# Patient Record
Sex: FEMALE | Race: WHITE | NOT HISPANIC OR LATINO | Employment: FULL TIME | URBAN - METROPOLITAN AREA
[De-identification: names, ages, dates, MRNs, and addresses within clinical notes are randomized per-mention and may not be internally consistent; named-entity substitution may affect disease eponyms.]

---

## 2019-08-05 ENCOUNTER — LAB (OUTPATIENT)
Dept: LAB | Facility: HOSPITAL | Age: 19
End: 2019-08-05

## 2019-08-05 ENCOUNTER — TRANSCRIBE ORDERS (OUTPATIENT)
Dept: LAB | Facility: HOSPITAL | Age: 19
End: 2019-08-05

## 2019-08-05 DIAGNOSIS — Z13.220 SCREENING FOR LIPOID DISORDERS: ICD-10-CM

## 2019-08-05 DIAGNOSIS — Z13.220 SCREENING FOR LIPOID DISORDERS: Primary | ICD-10-CM

## 2019-08-05 LAB
25(OH)D3 SERPL-MCNC: 56.4 NG/ML (ref 30–100)
ANION GAP SERPL CALCULATED.3IONS-SCNC: 13 MMOL/L (ref 5–15)
BASOPHILS # BLD AUTO: 0.04 10*3/MM3 (ref 0–0.2)
BASOPHILS NFR BLD AUTO: 0.7 % (ref 0–1.5)
BUN BLD-MCNC: 11 MG/DL (ref 6–20)
BUN/CREAT SERPL: 17.5 (ref 7–25)
CALCIUM SPEC-SCNC: 10.5 MG/DL (ref 8.6–10.5)
CHLORIDE SERPL-SCNC: 102 MMOL/L (ref 98–107)
CHOLEST SERPL-MCNC: 184 MG/DL (ref 0–200)
CO2 SERPL-SCNC: 27 MMOL/L (ref 22–29)
CREAT BLD-MCNC: 0.63 MG/DL (ref 0.57–1)
DEPRECATED RDW RBC AUTO: 40.3 FL (ref 37–54)
EOSINOPHIL # BLD AUTO: 0.03 10*3/MM3 (ref 0–0.4)
EOSINOPHIL NFR BLD AUTO: 0.5 % (ref 0.3–6.2)
ERYTHROCYTE [DISTWIDTH] IN BLOOD BY AUTOMATED COUNT: 11.9 % (ref 12.3–15.4)
GFR SERPL CREATININE-BSD FRML MDRD: 120 ML/MIN/1.73
GLUCOSE BLD-MCNC: 99 MG/DL (ref 65–99)
HCT VFR BLD AUTO: 43.5 % (ref 34–46.6)
HDLC SERPL-MCNC: 51 MG/DL (ref 40–60)
HGB BLD-MCNC: 13.8 G/DL (ref 12–15.9)
IMM GRANULOCYTES # BLD AUTO: 0.01 10*3/MM3 (ref 0–0.05)
IMM GRANULOCYTES NFR BLD AUTO: 0.2 % (ref 0–0.5)
LDLC SERPL CALC-MCNC: 109 MG/DL (ref 0–100)
LDLC/HDLC SERPL: 2.14 {RATIO}
LYMPHOCYTES # BLD AUTO: 2.19 10*3/MM3 (ref 0.7–3.1)
LYMPHOCYTES NFR BLD AUTO: 37.6 % (ref 19.6–45.3)
MCH RBC QN AUTO: 29.5 PG (ref 26.6–33)
MCHC RBC AUTO-ENTMCNC: 31.7 G/DL (ref 31.5–35.7)
MCV RBC AUTO: 92.9 FL (ref 79–97)
MONOCYTES # BLD AUTO: 0.47 10*3/MM3 (ref 0.1–0.9)
MONOCYTES NFR BLD AUTO: 8.1 % (ref 5–12)
NEUTROPHILS # BLD AUTO: 3.09 10*3/MM3 (ref 1.7–7)
NEUTROPHILS NFR BLD AUTO: 52.9 % (ref 42.7–76)
NRBC BLD AUTO-RTO: 0 /100 WBC (ref 0–0.2)
PLATELET # BLD AUTO: 256 10*3/MM3 (ref 140–450)
PMV BLD AUTO: 10.4 FL (ref 6–12)
POTASSIUM BLD-SCNC: 4.6 MMOL/L (ref 3.5–5.2)
RBC # BLD AUTO: 4.68 10*6/MM3 (ref 3.77–5.28)
SODIUM BLD-SCNC: 142 MMOL/L (ref 136–145)
TRIGL SERPL-MCNC: 119 MG/DL (ref 0–150)
TSH SERPL DL<=0.05 MIU/L-ACNC: 3.44 MIU/ML (ref 0.27–4.2)
TSH SERPL DL<=0.05 MIU/L-ACNC: 3.44 MIU/ML (ref 0.27–4.2)
VLDLC SERPL-MCNC: 23.8 MG/DL
WBC NRBC COR # BLD: 5.83 10*3/MM3 (ref 3.4–10.8)

## 2019-08-05 PROCEDURE — 80061 LIPID PANEL: CPT

## 2019-08-05 PROCEDURE — 85025 COMPLETE CBC W/AUTO DIFF WBC: CPT

## 2019-08-05 PROCEDURE — 80048 BASIC METABOLIC PNL TOTAL CA: CPT

## 2019-08-05 PROCEDURE — 82306 VITAMIN D 25 HYDROXY: CPT

## 2019-08-05 PROCEDURE — 36415 COLL VENOUS BLD VENIPUNCTURE: CPT

## 2019-08-05 PROCEDURE — 84443 ASSAY THYROID STIM HORMONE: CPT

## 2020-09-18 ENCOUNTER — OFFICE VISIT (OUTPATIENT)
Dept: OBSTETRICS AND GYNECOLOGY | Facility: CLINIC | Age: 20
End: 2020-09-18

## 2020-09-18 VITALS
HEIGHT: 67 IN | BODY MASS INDEX: 20.36 KG/M2 | SYSTOLIC BLOOD PRESSURE: 118 MMHG | DIASTOLIC BLOOD PRESSURE: 78 MMHG | WEIGHT: 129.7 LBS

## 2020-09-18 DIAGNOSIS — Z01.419 ENCOUNTER FOR ANNUAL ROUTINE GYNECOLOGICAL EXAMINATION: Primary | ICD-10-CM

## 2020-09-18 PROCEDURE — 99385 PREV VISIT NEW AGE 18-39: CPT | Performed by: NURSE PRACTITIONER

## 2020-09-18 RX ORDER — NORETHINDRONE ACETATE AND ETHINYL ESTRADIOL 1MG-20(21)
1 KIT ORAL DAILY
Qty: 28 TABLET | Refills: 12 | Status: SHIPPED | OUTPATIENT
Start: 2020-09-18 | End: 2021-07-27 | Stop reason: SDUPTHER

## 2020-09-18 RX ORDER — NORETHINDRONE ACETATE AND ETHINYL ESTRADIOL 1MG-20(21)
1 KIT ORAL DAILY
COMMUNITY
End: 2020-09-18 | Stop reason: SDUPTHER

## 2020-09-18 RX ORDER — FLUCONAZOLE 150 MG/1
150 TABLET ORAL AS NEEDED
Qty: 2 TABLET | Refills: 0 | Status: SHIPPED | OUTPATIENT
Start: 2020-09-18 | End: 2022-12-07

## 2020-09-18 NOTE — PROGRESS NOTES
GYN Annual Exam     CC - Here for annual exam.  C/O random episodes of vaginal itching for the last 4 months.       HPI  Cookie Magana is a 20 y.o. female, , who presents for annual well woman exam. Patient's last menstrual period was 2020 (approximate)..  Periods are regular every 25-35 days, lasting 3 days. The patient uses 1 of tampons/pads per hour., lasting 3 days.  Dysmenorrhea:none.  Patient reports problems with: none.  Partner Status: Marital Status: single.  New Partners since last visit: yes.  Desires STD Screening: yes.    Additional OB/GYN History   Current contraception: contraceptive methods: ocp  Desires to: continue contraception  Last Pap : never had   Last Completed Pap Smear       Status Date      PAP SMEAR No completions recorded        History of abnormal Pap smear: no  Family history of uterine, colon, breast, or ovarian cancer: no  Performs monthly Self-Breast Exam: yes  Exercises Regularly:yes  Feelings of Anxiety or Depression: no  Tobacco Usage?: No   OB History        0    Para   0    Term   0       0    AB   0    Living   0       SAB   0    TAB   0    Ectopic   0    Molar   0    Multiple   0    Live Births   0                Health Maintenance   Topic Date Due   • Annual Gynecologic Pelvic and Breast Exam  1998   • ANNUAL PHYSICAL  2001   • HPV VACCINES (1 - 2-dose series) 2009   • TDAP/TD VACCINES (1 - Tdap) 2017   • INFLUENZA VACCINE  2020   • HEPATITIS C SCREENING  2020   • CHLAMYDIA SCREENING  2020   • PAP SMEAR  2020   •  AMB Pneumococcal Vaccine 65+ (1 of 1 - PPSV23) 2063   •  AMB Pneumococcal Vaccine 0-64  Aged Out   • MENINGOCOCCAL VACCINE (Normal Risk)  Aged Out       The additional following portions of the patient's history were reviewed and updated as appropriate: allergies, current medications, past family history, past medical history, past social history, past surgical history and  "problem list.    Review of Systems   Constitutional: Negative.    HENT: Negative.    Eyes: Negative.    Respiratory: Negative.    Cardiovascular: Negative.    Gastrointestinal: Negative.    Endocrine: Negative.    Genitourinary: Negative.    Musculoskeletal: Negative.    Skin: Negative.    Allergic/Immunologic: Negative.    Neurological: Negative.    Hematological: Negative.    Psychiatric/Behavioral: Negative.      All other systems reviewed and are negative.     I have reviewed and agree with the HPI, ROS, and historical information as entered above. Mariam TAY Gertrude, APRN    Objective   /78   Ht 170.2 cm (67\")   Wt 58.8 kg (129 lb 11.2 oz)   LMP 08/19/2020 (Approximate)   BMI 20.31 kg/m²     Physical Exam  Vitals signs and nursing note reviewed. Exam conducted with a chaperone present.   Constitutional:       Appearance: She is well-developed.   HENT:      Head: Normocephalic and atraumatic.   Neck:      Musculoskeletal: Normal range of motion. No muscular tenderness.      Thyroid: No thyroid mass or thyromegaly.   Cardiovascular:      Rate and Rhythm: Normal rate and regular rhythm.      Heart sounds: No murmur.   Pulmonary:      Effort: Pulmonary effort is normal. No retractions.      Breath sounds: Normal breath sounds. No wheezing, rhonchi or rales.   Chest:      Chest wall: No mass or tenderness.      Breasts:         Right: Normal. No mass, nipple discharge, skin change or tenderness.         Left: Normal. No mass, nipple discharge, skin change or tenderness.   Abdominal:      General: Bowel sounds are normal.      Palpations: Abdomen is soft. Abdomen is not rigid. There is no mass.      Tenderness: There is no abdominal tenderness. There is no guarding.      Hernia: No hernia is present. There is no hernia in the left inguinal area.   Genitourinary:     Labia:         Right: No rash, tenderness or lesion.         Left: No rash, tenderness or lesion.       Vagina: Vaginal discharge present. No " lesions.      Cervix: No cervical motion tenderness, discharge, lesion or cervical bleeding.      Uterus: Normal. Not enlarged, not fixed and not tender.       Adnexa:         Right: No mass or tenderness.          Left: No mass or tenderness.        Rectum: No external hemorrhoid.      Comments: Small amt white discharge  Neurological:      Mental Status: She is alert and oriented to person, place, and time.   Psychiatric:         Behavior: Behavior normal.            Assessment and Plan    Problem List Items Addressed This Visit     None      Visit Diagnoses     Encounter for annual routine gynecological examination    -  Primary    Relevant Orders    Pap IG, Ct-Ng TV Rfx HPV ASCU        WP=negative    1. GYN annual well woman exam.   2. Encouraged use of condoms for STD prevention.  3. OCP's/Vaginal Ring - Discussed side effects of nausea, BTB, headaches, breast tenderness and slight weight gain in the first three cycles.  Understands risks of blood clots, stroke, and theoretical risk of breast cancer.  Denies family history of blood clots.  4. Reviewed monthly self breast exams.  Instructed to call with lumps, pain, or breast discharge.    5. RTC in 1 year or PRN with problems   6. Reviewed vulvar hygiene and products, prn diflucan given but WP neg so unlikely yeast      Mariam Loredo, APRN  09/18/2020

## 2020-10-01 ENCOUNTER — TELEPHONE (OUTPATIENT)
Dept: OBSTETRICS AND GYNECOLOGY | Facility: CLINIC | Age: 20
End: 2020-10-01

## 2020-10-01 NOTE — TELEPHONE ENCOUNTER
Pt calling for pap results - all negative from 9/18  Diflucan and Loestrin 1/20 (on back order) I called the pharmacy and they do not have a different generic - Left message

## 2020-10-05 ENCOUNTER — TELEPHONE (OUTPATIENT)
Dept: OBSTETRICS AND GYNECOLOGY | Facility: CLINIC | Age: 20
End: 2020-10-05

## 2020-10-05 NOTE — TELEPHONE ENCOUNTER
Her OCP is still backordered.  She is due to start period and needs another OCP called in     Loestrin 1/20 was ordered

## 2020-10-16 DIAGNOSIS — Z01.419 ENCOUNTER FOR ANNUAL ROUTINE GYNECOLOGICAL EXAMINATION: ICD-10-CM

## 2020-12-01 ENCOUNTER — LAB (OUTPATIENT)
Dept: LAB | Facility: HOSPITAL | Age: 20
End: 2020-12-01

## 2020-12-01 DIAGNOSIS — L04.9 ACUTE LYMPHADENITIS: Primary | ICD-10-CM

## 2020-12-01 LAB
BASOPHILS # BLD AUTO: 0.02 10*3/MM3 (ref 0–0.2)
BASOPHILS NFR BLD AUTO: 0.4 % (ref 0–1.5)
CHOLEST SERPL-MCNC: 240 MG/DL (ref 0–200)
CRP SERPL-MCNC: 0.15 MG/DL (ref 0–0.5)
DEPRECATED RDW RBC AUTO: 40.9 FL (ref 37–54)
EOSINOPHIL # BLD AUTO: 0.03 10*3/MM3 (ref 0–0.4)
EOSINOPHIL NFR BLD AUTO: 0.6 % (ref 0.3–6.2)
ERYTHROCYTE [DISTWIDTH] IN BLOOD BY AUTOMATED COUNT: 11.9 % (ref 12.3–15.4)
ERYTHROCYTE [SEDIMENTATION RATE] IN BLOOD: 23 MM/HR (ref 0–20)
HCT VFR BLD AUTO: 42.9 % (ref 34–46.6)
HDLC SERPL-MCNC: 51 MG/DL (ref 40–60)
HGB BLD-MCNC: 13.7 G/DL (ref 12–15.9)
IMM GRANULOCYTES # BLD AUTO: 0.02 10*3/MM3 (ref 0–0.05)
IMM GRANULOCYTES NFR BLD AUTO: 0.4 % (ref 0–0.5)
LDLC SERPL CALC-MCNC: 164 MG/DL (ref 0–100)
LDLC/HDLC SERPL: 3.16 {RATIO}
LYMPHOCYTES # BLD AUTO: 1.62 10*3/MM3 (ref 0.7–3.1)
LYMPHOCYTES NFR BLD AUTO: 32.6 % (ref 19.6–45.3)
MCH RBC QN AUTO: 29.9 PG (ref 26.6–33)
MCHC RBC AUTO-ENTMCNC: 31.9 G/DL (ref 31.5–35.7)
MCV RBC AUTO: 93.7 FL (ref 79–97)
MONOCYTES # BLD AUTO: 0.41 10*3/MM3 (ref 0.1–0.9)
MONOCYTES NFR BLD AUTO: 8.2 % (ref 5–12)
NEUTROPHILS NFR BLD AUTO: 2.87 10*3/MM3 (ref 1.7–7)
NEUTROPHILS NFR BLD AUTO: 57.8 % (ref 42.7–76)
NRBC BLD AUTO-RTO: 0 /100 WBC (ref 0–0.2)
PLATELET # BLD AUTO: 272 10*3/MM3 (ref 140–450)
PMV BLD AUTO: 9.6 FL (ref 6–12)
RBC # BLD AUTO: 4.58 10*6/MM3 (ref 3.77–5.28)
TRIGL SERPL-MCNC: 140 MG/DL (ref 0–150)
VLDLC SERPL-MCNC: 25 MG/DL (ref 5–40)
WBC # BLD AUTO: 4.97 10*3/MM3 (ref 3.4–10.8)

## 2020-12-01 PROCEDURE — 85652 RBC SED RATE AUTOMATED: CPT

## 2020-12-01 PROCEDURE — 86140 C-REACTIVE PROTEIN: CPT

## 2020-12-01 PROCEDURE — 85025 COMPLETE CBC W/AUTO DIFF WBC: CPT

## 2020-12-01 PROCEDURE — 36415 COLL VENOUS BLD VENIPUNCTURE: CPT

## 2020-12-01 PROCEDURE — 80061 LIPID PANEL: CPT

## 2021-03-11 ENCOUNTER — LAB (OUTPATIENT)
Dept: LAB | Facility: HOSPITAL | Age: 21
End: 2021-03-11

## 2021-03-11 DIAGNOSIS — Z13.220 SCREENING FOR LIPOID DISORDERS: Primary | ICD-10-CM

## 2021-03-11 LAB
CHOLEST SERPL-MCNC: 219 MG/DL (ref 0–200)
HDLC SERPL-MCNC: 56 MG/DL (ref 40–60)
LDLC SERPL CALC-MCNC: 138 MG/DL (ref 0–100)
LDLC/HDLC SERPL: 2.42 {RATIO}
TRIGL SERPL-MCNC: 138 MG/DL (ref 0–150)
VLDLC SERPL-MCNC: 25 MG/DL (ref 5–40)

## 2021-03-11 PROCEDURE — 80061 LIPID PANEL: CPT

## 2021-03-11 PROCEDURE — 36415 COLL VENOUS BLD VENIPUNCTURE: CPT

## 2021-07-27 ENCOUNTER — OFFICE VISIT (OUTPATIENT)
Dept: OBSTETRICS AND GYNECOLOGY | Facility: CLINIC | Age: 21
End: 2021-07-27

## 2021-07-27 VITALS
SYSTOLIC BLOOD PRESSURE: 110 MMHG | WEIGHT: 130 LBS | BODY MASS INDEX: 20.4 KG/M2 | HEIGHT: 67 IN | DIASTOLIC BLOOD PRESSURE: 70 MMHG

## 2021-07-27 DIAGNOSIS — Z30.8 ENCOUNTER FOR OTHER CONTRACEPTIVE MANAGEMENT: Primary | ICD-10-CM

## 2021-07-27 PROCEDURE — 99213 OFFICE O/P EST LOW 20 MIN: CPT | Performed by: NURSE PRACTITIONER

## 2021-07-27 RX ORDER — NORETHINDRONE ACETATE AND ETHINYL ESTRADIOL 1MG-20(21)
KIT ORAL
Qty: 364 TABLET | Refills: 0 | Status: SHIPPED | OUTPATIENT
Start: 2021-07-27 | End: 2022-11-08 | Stop reason: SDUPTHER

## 2021-07-27 NOTE — PROGRESS NOTES
Chief Complaint   Patient presents with   • Contraception     counseling        Subjective   HPI  Cookie Magana is a 20 y.o. female, , LMP was on Patient's last menstrual period was 2021 (approximate). who presents for Family Planning.    Her periods are irregular, lasting 4 days.  She c/o severe dysmenorrhea during menses.  She stopped OCP's approximately 3 weeks ago.  She did not take them regularly which she suspects contributed to her irregular bleeding and cramping.  She has been using condoms for contraception and last IC was approx. 1 week ago.     She desires Nexplanon.  She is moving [place] for [place] and would like something more convenient and reliable for contraception.       Partner Status: Marital Status: single.  New Partners since last visit: yes.  She had negative STD testing in March. Her past medical history is not noncontributory.  The patient reports     Current Outpatient Medications on File Prior to Visit   Medication Sig Dispense Refill   • fluconazole (DIFLUCAN) 150 MG tablet Take 1 tablet by mouth As Needed (yeast). Take one tablet now and repeat in 3 days 2 tablet 0   • [DISCONTINUED] norethindrone-ethinyl estradiol FE (Loestrin Fe ) 1-20 MG-MCG per tablet Take 1 tablet by mouth Daily. 28 tablet 12     No current facility-administered medications on file prior to visit.        Additional OB/GYN History   Last Pap :   Last Completed Pap Smear          PAP SMEAR (Every 3 Years) Next due on 10/16/2023    10/16/2020  SCANNED - PAP SMEAR    2020  Pap IG, Ct-Ng TV Rfx HPV ASCU              History of abnormal Pap smear: no  Exercises Regularly: yes  Feelings of Anxiety or Depression: no  Tobacco Usage?: No   OB History        0    Para   0    Term   0       0    AB   0    Living   0       SAB   0    TAB   0    Ectopic   0    Molar   0    Multiple   0    Live Births   0                The additional following portions of the patient's history were  "reviewed and updated as appropriate: allergies, current medications, past family history, past medical history, past social history, past surgical history and problem list.    Review of Systems   Constitutional: Negative.    HENT: Negative.    Eyes: Negative.    Respiratory: Negative.    Cardiovascular: Negative.    Gastrointestinal: Negative.    Endocrine: Negative.    Genitourinary: Positive for menstrual problem and pelvic pain.   Musculoskeletal: Negative.    Skin: Negative.    Allergic/Immunologic: Negative.    Neurological: Negative.    Hematological: Negative.    Psychiatric/Behavioral: Negative.      All other systems reviewed and are negative.     I have reviewed and agree with the HPI, ROS, and historical information as entered above. Courtney Ham Armani, APRN    Objective   /70   Ht 170.2 cm (67\")   Wt 59 kg (130 lb)   LMP 07/01/2021 (Approximate)   Breastfeeding No   BMI 20.36 kg/m²     Physical Exam  Vitals and nursing note reviewed. Exam conducted with a chaperone present.   Constitutional:       Appearance: She is well-developed.   HENT:      Head: Normocephalic and atraumatic.   Neck:      Thyroid: No thyroid mass or thyromegaly.   Pulmonary:      Effort: Pulmonary effort is normal. No retractions.   Chest:      Chest wall: No mass.      Breasts:         Right: Normal. No mass, nipple discharge, skin change or tenderness.         Left: Normal. No mass, nipple discharge, skin change or tenderness.   Abdominal:      Palpations: Abdomen is soft. Abdomen is not rigid. There is no mass.      Tenderness: There is no abdominal tenderness. There is no guarding.      Hernia: No hernia is present. There is no hernia in the left inguinal area.   Genitourinary:     Labia:         Right: No rash, tenderness or lesion.         Left: No rash, tenderness or lesion.       Vagina: Normal. No vaginal discharge or lesions.      Cervix: No cervical motion tenderness, discharge, lesion or cervical bleeding.      " Uterus: Normal. Not enlarged, not fixed and not tender.       Adnexa:         Right: No mass or tenderness.          Left: No mass or tenderness.        Rectum: No external hemorrhoid.   Musculoskeletal:      Cervical back: Normal range of motion. No muscular tenderness.   Skin:     General: Skin is warm and dry.   Neurological:      Mental Status: She is alert and oriented to person, place, and time.   Psychiatric:         Mood and Affect: Mood normal.         Behavior: Behavior normal.         Assessment/Plan     Assessment and Plan    Problem List Items Addressed This Visit     None      Visit Diagnoses     Encounter for other contraceptive management    -  Primary    Relevant Medications    norethindrone-ethinyl estradiol FE (Loestrin Fe 1/20) 1-20 MG-MCG per tablet          1.   Rev options, including patch, Nuvaring, Annovera, Nexplanon, Kyleena; risks, benefits, side effects, correct use of each.  She wants to try to  Rx for OCPs for a year and take them with her.  She will call if she decides to change methods before she leaves in Sept.  Enc to take daily same time and encouraged condoms.  She will check to see if her insurance will cover an early annual before she leaves.  Return in about 1 year (around 7/27/2022) for Annual physical.      Courtney Lomeli, APRN  07/27/2021

## 2021-08-13 ENCOUNTER — TELEPHONE (OUTPATIENT)
Dept: OBSTETRICS AND GYNECOLOGY | Facility: CLINIC | Age: 21
End: 2021-08-13

## 2021-08-13 NOTE — TELEPHONE ENCOUNTER
Explained that this was sent 7/27 and confirmed by the pharmacy at 6:16pm. She will call them back

## 2021-08-13 NOTE — TELEPHONE ENCOUNTER
Patient left a message stating that she spoke with the pharmacy in [place] and they are requesting our office call them to give a verbal instead of just electronically sending it there

## 2021-08-25 PROCEDURE — U0004 COV-19 TEST NON-CDC HGH THRU: HCPCS | Performed by: FAMILY MEDICINE

## 2022-08-08 DIAGNOSIS — Z30.8 ENCOUNTER FOR OTHER CONTRACEPTIVE MANAGEMENT: ICD-10-CM

## 2022-08-08 RX ORDER — NORETHINDRONE ACETATE AND ETHINYL ESTRADIOL 1MG-20(21)
KIT ORAL
Qty: 364 TABLET | Refills: 0 | OUTPATIENT
Start: 2022-08-08

## 2022-11-08 DIAGNOSIS — Z30.8 ENCOUNTER FOR OTHER CONTRACEPTIVE MANAGEMENT: ICD-10-CM

## 2022-11-08 RX ORDER — NORETHINDRONE ACETATE AND ETHINYL ESTRADIOL 1MG-20(21)
KIT ORAL
Qty: 364 TABLET | Refills: 12 | Status: SHIPPED | OUTPATIENT
Start: 2022-11-08 | End: 2022-11-09

## 2022-11-08 NOTE — TELEPHONE ENCOUNTER
Caller: Cookie Magana    Relationship: Self    Best call back number: [phone] CALL ANYTIME, IT IS OKAY TO LVM.    Requested Prescriptions:   Requested Prescriptions     Pending Prescriptions Disp Refills   • norethindrone-ethinyl estradiol FE (Loestrin Fe ) 1-20 MG-MCG per tablet 364 tablet 0     Si po qd     LOESTRIN FE 1-20 MG-MCG TABLET    Pharmacy where request should be sent:  [address]    Additional details provided by patient: PT IS SCHEDULED FOR ANNUAL APPT 22. PT IS OUT OF BIRTH CONTROL MEDICATION. PT IS REQUESTING A REFILL THAT WILL LAST UNTIL SHE CAN BE SEEN 22    Does the patient have less than a 3 day supply:  [x] Yes  [] No    Sissy Travis Rep   22 13:04 EST

## 2022-11-09 ENCOUNTER — TELEPHONE (OUTPATIENT)
Dept: OBSTETRICS AND GYNECOLOGY | Facility: CLINIC | Age: 22
End: 2022-11-09

## 2022-11-09 RX ORDER — NORETHINDRONE ACETATE AND ETHINYL ESTRADIOL 1; .02 MG/1; MG/1
1 TABLET ORAL DAILY
Qty: 28 TABLET | Refills: 0 | Status: SHIPPED | OUTPATIENT
Start: 2022-11-09 | End: 2022-11-28

## 2022-11-09 NOTE — TELEPHONE ENCOUNTER
S/w patient- patient requesting refill on birth control. Patient states that she is not moving out of the country and does not need a year's worth at once. Rx change and sent to patient pharmacy.

## 2022-11-09 NOTE — TELEPHONE ENCOUNTER
PT CALLING BECAUSE RX FOR BC WAS SENT FOR A YR SUPPLY AND SHE NEEDS IT TO ONLY BE A ONE MONTH TO BE APPROVED BY PHARMACY.

## 2022-11-28 RX ORDER — NORETHINDRONE ACETATE AND ETHINYL ESTRADIOL 1; 20 MG/1; UG/1
TABLET ORAL
Qty: 21 TABLET | Refills: 0 | Status: SHIPPED | OUTPATIENT
Start: 2022-11-28 | End: 2022-12-07

## 2022-12-07 ENCOUNTER — OFFICE VISIT (OUTPATIENT)
Dept: OBSTETRICS AND GYNECOLOGY | Facility: CLINIC | Age: 22
End: 2022-12-07

## 2022-12-07 VITALS
SYSTOLIC BLOOD PRESSURE: 102 MMHG | HEIGHT: 68 IN | BODY MASS INDEX: 19.82 KG/M2 | WEIGHT: 130.8 LBS | DIASTOLIC BLOOD PRESSURE: 72 MMHG

## 2022-12-07 DIAGNOSIS — Z01.419 WOMEN'S ANNUAL ROUTINE GYNECOLOGICAL EXAMINATION: Primary | ICD-10-CM

## 2022-12-07 DIAGNOSIS — Z30.41 ENCOUNTER FOR SURVEILLANCE OF CONTRACEPTIVE PILLS: ICD-10-CM

## 2022-12-07 PROCEDURE — 99395 PREV VISIT EST AGE 18-39: CPT | Performed by: NURSE PRACTITIONER

## 2022-12-07 RX ORDER — NORETHINDRONE ACETATE AND ETHINYL ESTRADIOL 1MG-20(21)
1 KIT ORAL DAILY
Qty: 84 TABLET | Refills: 3 | Status: SHIPPED | OUTPATIENT
Start: 2022-12-07

## 2022-12-07 NOTE — PROGRESS NOTES
Gynecologic Annual Exam Note        Gynecologic Exam (Annual )        Subjective     HPI  Cookie Magana is a 22 y.o.  female who presents for annual well woman exam as a established patient. There were no changes to her medical or surgical history since her last visit.. Patient reports problems with: none. Patient's last menstrual period was 2022.. Her periods are regular every 25-35 days, lasting 5 days. The flow is light. Dysmenorrhea:none. . Partner Status: Marital Status: single.  She is sexually active. She has had new partners.. STD testing recommendations have been explained to the patient and she does desire STD testing.    Patient would like to discuss an IUD in the future.     Additional OB/GYN History   Current contraception: contraceptive methods: OCP (estrogen/progesterone)  Desires to: discuss contraception  Thromboembolic Disease: family history  Age of menarche: 12    History of STD: no    Last Pap : 20. Results: negative. HPV: not done  Last Completed Pap Smear          Ordered - PAP SMEAR (Every 3 Years) Ordered on 2022    10/16/2020  SCANNED - PAP SMEAR    2020  Pap IG, Ct-Ng TV Rfx HPV ASCU                 History of abnormal Pap smear: no  Gardasil status:completed  Family history of uterine, colon, breast, or ovarian cancer: no  Performs monthly Self-Breast Exam: no  Exercises Regularly:yes  Feelings of Anxiety or Depression: yes - Anxiety   Tobacco Usage?: No       Current Outpatient Medications:   •  norethindrone-ethinyl estradiol FE (Junel FE 1/20) 1-20 MG-MCG per tablet, Take 1 tablet by mouth Daily., Disp: 84 tablet, Rfl: 3     Patient is requesting refills of OCP.    OB History        0    Para   0    Term   0       0    AB   0    Living   0       SAB   0    IAB   0    Ectopic   0    Molar   0    Multiple   0    Live Births   0                Health Maintenance   Topic Date Due   • HPV VACCINES (1 - 2-dose series) Never done   •  "HEPATITIS C SCREENING  Never done   • COVID-19 Vaccine (3 - Booster for Pfizer series) 06/16/2021   • Annual Gynecologic Pelvic and Breast Exam  10/17/2021   • INFLUENZA VACCINE  Never done   • PAP SMEAR  10/16/2023   • CHLAMYDIA SCREENING  12/07/2023   • TDAP/TD VACCINES (2 - Td or Tdap) 08/05/2029   • Pneumococcal Vaccine 0-64  Aged Out       Past Medical History:   Diagnosis Date   • Anxiety    • PMS (premenstrual syndrome)         Past Surgical History:   Procedure Laterality Date   • WISDOM TOOTH EXTRACTION         The additional following portions of the patient's history were reviewed and updated as appropriate: allergies, current medications, past family history, past medical history, past social history, past surgical history and problem list.    Review of Systems   Constitutional: Negative.    Cardiovascular: Negative.    Gastrointestinal: Negative.    Genitourinary: Negative.    Psychiatric/Behavioral: Negative.         Anxiety         I have reviewed and agree with the HPI, ROS, and historical information as entered above. Asmita Ole, APRN        Objective   /72   Ht 172.7 cm (68\")   Wt 59.3 kg (130 lb 12.8 oz)   LMP 11/16/2022   BMI 19.89 kg/m²     Physical Exam  Vitals and nursing note reviewed. Exam conducted with a chaperone present.   Constitutional:       Appearance: She is well-developed.   HENT:      Head: Normocephalic and atraumatic.   Neck:      Thyroid: No thyroid mass or thyromegaly.   Cardiovascular:      Rate and Rhythm: Normal rate and regular rhythm.      Heart sounds: No murmur heard.  Pulmonary:      Effort: Pulmonary effort is normal. No retractions.      Breath sounds: Normal breath sounds. No wheezing, rhonchi or rales.   Chest:      Chest wall: No mass or tenderness.   Breasts:     Right: Normal. No mass, nipple discharge, skin change or tenderness.      Left: Normal. No mass, nipple discharge, skin change or tenderness.   Abdominal:      Palpations: Abdomen is soft. " Abdomen is not rigid. There is no mass.      Tenderness: There is no abdominal tenderness. There is no guarding.      Hernia: No hernia is present.   Genitourinary:     General: Normal vulva.      Labia:         Right: No rash, tenderness or lesion.         Left: No rash, tenderness or lesion.       Vagina: Normal. No vaginal discharge or lesions.      Cervix: No cervical motion tenderness, discharge, lesion or cervical bleeding.      Uterus: Normal. Not enlarged, not fixed and not tender.       Adnexa: Right adnexa normal and left adnexa normal.        Right: No mass or tenderness.          Left: No mass or tenderness.        Rectum: Normal. No external hemorrhoid.   Musculoskeletal:      Cervical back: Normal range of motion. No muscular tenderness.   Neurological:      Mental Status: She is alert and oriented to person, place, and time.   Psychiatric:         Behavior: Behavior normal.            Assessment and Plan    Problem List Items Addressed This Visit    None  Visit Diagnoses     Women's annual routine gynecological examination    -  Primary    Relevant Orders    LIQUID-BASED PAP SMEAR, [facility] ([facility])    Encounter for surveillance of contraceptive pills        Relevant Medications    norethindrone-ethinyl estradiol FE (Junel FE 1/20) 1-20 MG-MCG per tablet          1. GYN annual well woman exam.   2. Doing well on current ocps. (Junel 1/20).  May be interested in Kyleena IUD later. Information given to patient for review  3. Reviewed pap guidelines.   4. Reviewed monthly self breast exams.  Instructed to call with lumps, pain, or breast discharge.    5. Reviewed exercise as a preventative health measures.   6. Reccommended Flu Vaccine in Fall of each year.  7. RTC in 1 year or PRN with problems        Asmita Handy, APRN  12/07/2022

## 2022-12-09 ENCOUNTER — TELEPHONE (OUTPATIENT)
Dept: OBSTETRICS AND GYNECOLOGY | Facility: CLINIC | Age: 22
End: 2022-12-09

## 2022-12-09 LAB — REF LAB TEST METHOD: NORMAL

## 2022-12-09 NOTE — TELEPHONE ENCOUNTER
BRIDGET Handy pt     Per Asmita, Pap showed ASCUS HPV negative. STD testing negative. recommendation is to repeat pap in one year since ASCUS but not concerning since HPV is negative. Pt XIAO.

## 2022-12-20 RX ORDER — NORETHINDRONE ACETATE AND ETHINYL ESTRADIOL 1; 20 MG/1; UG/1
TABLET ORAL
Qty: 21 TABLET | Refills: 0 | OUTPATIENT
Start: 2022-12-20

## 2023-03-17 ENCOUNTER — TELEPHONE (OUTPATIENT)
Dept: OBSTETRICS AND GYNECOLOGY | Facility: CLINIC | Age: 23
End: 2023-03-17
Payer: COMMERCIAL

## 2023-03-17 ENCOUNTER — TELEPHONE (OUTPATIENT)
Dept: OBSTETRICS AND GYNECOLOGY | Facility: CLINIC | Age: 23
End: 2023-03-17

## 2023-03-17 NOTE — TELEPHONE ENCOUNTER
Pt calling in reporting breast pain  Stated right breast stated 10 oclock position close to arm pit  Stated it has been looked at before but was instructed to call if she had pain outside of menstrual cycle and it has now done so stated outside of menstrual window about two months and is progressively becoming more often    stated pain is 4/10 on pain scale pt stated pain is dull and achey pt stated it hasnt gotten bigger   Reports no redness swelling or streaking and no discharge or blood coming from nipples

## 2023-03-17 NOTE — TELEPHONE ENCOUNTER
Caller: Cookie Magana    Relationship: Self    Best call back number: [phone]    What is the best time to reach you: ANYTIME CAN LVM IF NA    Who are you requesting to speak with (clinical staff, provider,  specific staff member): JEOVANNY    Do you know the name of the person who called: JEOVANNY    What was the call regarding: BREAST PAIN    Do you require a callback: YES      ”

## 2023-04-14 NOTE — PROGRESS NOTES
OBGYN Office Note        Subjective     HPI  Cookie Magana is a 22 y.o. female, , who presents with breast complaints of a mass.  This is present in right breast(s).    She states she has experienced this problem for 1 year.  She describes the severity as moderate.  She states that the problem is worsening.  She reports breast pain that is achy and feels like a pinch. The pain comes and goes and is worse when she is not on her period or about to start her period. It sometimes radiates to under her right armpit. On average she rates her pain a 4/10 and when it is at it's worst a 6/10. She has tried to cut back on caffeine and has tried OTC NSAIDS with little to no relief. She does not have a family history of breast cancer..  The patient denies rash, redness, nipple discharge, peeling skin.    Her last LMP was Patient's last menstrual period was 2023 (exact date)..  Periods are regular every 28-30 days. Patient states her periods were regular prior to having to take a Plan B pill at the beginning of this month.    Current contraception: contraceptive methods: OCP (estrogen/progesterone)    Last mammogram: Never   Last Completed Mammogram     This patient has no relevant Health Maintenance data.        Tobacco Usage?: No     OB History        0    Para   0    Term   0       0    AB   0    Living   0       SAB   0    IAB   0    Ectopic   0    Molar   0    Multiple   0    Live Births   0                Past Medical History:   Diagnosis Date   • Anxiety    • PMS (premenstrual syndrome)        Past Surgical History:   Procedure Laterality Date   • WISDOM TOOTH EXTRACTION         Family History   Problem Relation Age of Onset   • Coronary artery disease Father    • Deep vein thrombosis Father    • Hypertension Mother           Review of Systems   Genitourinary: Positive for breast lump and breast pain.   All other systems reviewed and are negative.      I have reviewed and agree with the  "HPI, ROS, and historical information as entered above. Carmel Iglesias MD    Objective   /70   Ht 172.7 cm (67.99\")   Wt 62.5 kg (137 lb 12.8 oz)   LMP 03/06/2023 (Exact Date)   BMI 20.96 kg/m²     Physical Exam  Vitals and nursing note reviewed. Exam conducted with a chaperone present.   HENT:      Head: Normocephalic and atraumatic.   Pulmonary:      Effort: Pulmonary effort is normal. No respiratory distress or retractions.   Chest:      Chest wall: No mass.   Breasts:     Right: No swelling, bleeding, inverted nipple, mass, nipple discharge, skin change or tenderness.      Left: No swelling, bleeding, inverted nipple, mass, nipple discharge, skin change or tenderness.   Lymphadenopathy:      Upper Body:      Right upper body: No supraclavicular or axillary adenopathy.      Left upper body: No supraclavicular or axillary adenopathy.   Neurological:      Mental Status: She is alert.   Psychiatric:         Mood and Affect: Mood and affect normal.         Speech: Speech normal.           Assessment & Plan     Assessment     Problem List Items Addressed This Visit    None  Visit Diagnoses     Mastodynia    -  Primary    Relevant Orders    US Breast Right Complete            Plan   1. Patient presents today for evaluation of mastodynia.  She reports intermittent episodes of right breast pain over the last year.  She feels like this has been worsening more recently.  Typically the pain correlates to her cycle, but she has noted that sometimes this does not correlate.  At the worst the pain is a 6 out of 10.  This is always present in the right breast in the upper inner quadrant.  The left breast is completely asymptomatic.  She has not been able to palpate a mass and has no skin changes or other concerns with the appearance of her breast.  She has recently tried to cut down on caffeine, but has not had a significant change in her symptoms.  Her examination is within normal limits today.  We will proceed with a " right breast ultrasound to further evaluate and ensure there is no mass or cyst.  I have instructed the patient to continue breast awareness and monitor her symptoms.  We did discuss over-the-counter treatment options as well.  Patient to call if worsening or not improving.  2. We did discuss wearing a properly fitting supportive bra.  3. Return for Next scheduled follow up.      Carmel Iglesias MD  04/17/2023

## 2023-04-17 ENCOUNTER — OFFICE VISIT (OUTPATIENT)
Dept: OBSTETRICS AND GYNECOLOGY | Facility: CLINIC | Age: 23
End: 2023-04-17
Payer: COMMERCIAL

## 2023-04-17 VITALS
SYSTOLIC BLOOD PRESSURE: 100 MMHG | HEIGHT: 68 IN | WEIGHT: 137.8 LBS | BODY MASS INDEX: 20.88 KG/M2 | DIASTOLIC BLOOD PRESSURE: 70 MMHG

## 2023-04-17 DIAGNOSIS — N64.4 MASTODYNIA: Primary | ICD-10-CM

## 2023-06-14 ENCOUNTER — HOSPITAL ENCOUNTER (OUTPATIENT)
Dept: ULTRASOUND IMAGING | Facility: HOSPITAL | Age: 23
Discharge: HOME | End: 2023-06-14
Admitting: OBSTETRICS & GYNECOLOGY
Payer: COMMERCIAL

## 2023-06-14 DIAGNOSIS — N64.4 MASTODYNIA: ICD-10-CM

## 2023-06-14 PROCEDURE — 76642 ULTRASOUND BREAST LIMITED: CPT

## 2023-10-24 ENCOUNTER — OFFICE VISIT (OUTPATIENT)
Dept: FAMILY MEDICINE CLINIC | Facility: CLINIC | Age: 23
End: 2023-10-24
Payer: COMMERCIAL

## 2023-10-24 VITALS
TEMPERATURE: 98 F | BODY MASS INDEX: 21.84 KG/M2 | DIASTOLIC BLOOD PRESSURE: 78 MMHG | RESPIRATION RATE: 20 BRPM | HEART RATE: 92 BPM | WEIGHT: 143.6 LBS | SYSTOLIC BLOOD PRESSURE: 112 MMHG | OXYGEN SATURATION: 96 %

## 2023-10-24 DIAGNOSIS — M25.572 ACUTE LEFT ANKLE PAIN: ICD-10-CM

## 2023-10-24 DIAGNOSIS — M79.672 LEFT FOOT PAIN: ICD-10-CM

## 2023-10-24 DIAGNOSIS — S99.912A INJURY OF LEFT ANKLE, INITIAL ENCOUNTER: Primary | ICD-10-CM

## 2023-10-24 NOTE — PROGRESS NOTES
Chief Complaint   Patient presents with    Ankle Injury     Left, injury 9/9/23       HPI     Cookie Magana is a pleasant 23 y.o. female with a PMH of hyperlipidemia who presents for an initial visit.     Previously followed by primary care provider in [place], [place]. Just moved back to [place] in July.  On 9/9 she states she suffered a left ankle inversion ankle injury when she stepped in a hole in the ground on a family member's property. Thought she had fractured her ankle at first due to initial pain. Immediately got up and walked but felt wobbly and that things were moving around. Tried Rosette's wart topically which helped with pain (sister is an herbalist). She has pain with range of motion, walking, rolling over in bed, and during cold weather. It no longer seems to be improving. She has been icing, heating, elevating. She denies prior ankle injuries or problems. No prior imaging or evaluation. Pain 3/10 currently.     Past Medical History:   Diagnosis Date    Anxiety     PMS (premenstrual syndrome)        Past Surgical History:   Procedure Laterality Date    WISDOM TOOTH EXTRACTION         Family History   Problem Relation Age of Onset    Hypertension Mother     Coronary artery disease Father     Deep vein thrombosis Father     Breast cancer Neg Hx     Ovarian cancer Neg Hx        Social History     Socioeconomic History    Marital status: Single   Tobacco Use    Smoking status: Never    Smokeless tobacco: Never   Vaping Use    Vaping Use: Never used   Substance and Sexual Activity    Alcohol use: Yes    Drug use: Never    Sexual activity: Yes     Partners: Male     Birth control/protection: Condom       Allergies   Allergen Reactions    Cefdinir Other (See Comments)       ROS    Review of Systems   Musculoskeletal:  Positive for arthralgias and joint swelling.       Vitals:    10/24/23 1124   BP: 112/78   Pulse: 92   Resp: 20   Temp: 98 °F (36.7 °C)   SpO2: 96%     Body mass index is 21.84  kg/m².      Current Outpatient Medications:     norethindrone-ethinyl estradiol FE (Junel FE 1/20) 1-20 MG-MCG per tablet, Take 1 tablet by mouth Daily. (Patient not taking: Reported on 10/24/2023), Disp: 84 tablet, Rfl: 3    PE    Physical Exam  Vitals reviewed.   Constitutional:       General: She is not in acute distress.  Pulmonary:      Effort: Pulmonary effort is normal. No respiratory distress.   Musculoskeletal:      Left foot: Normal range of motion.        Feet:    Feet:      Comments: Left ankle and foot tenderness as shown. Some bony lateral malleolus tenderness. Pt reports pain with ankle ROM which is intact. Neurovascularly intact.   Neurological:      Mental Status: She is alert.   Psychiatric:         Mood and Affect: Mood normal.          A/P    Problem List Items Addressed This Visit    None  Visit Diagnoses       Injury of left ankle, initial encounter    -  Primary    Order x-rays of foot/ankle to r/o fracture.   Given more than 6-week duration, will plan on orthopedic consulation.    Relevant Orders    XR Ankle 3+ View Left    Acute left ankle pain        Relevant Orders    XR Ankle 3+ View Left    Left foot pain        Relevant Orders    XR Foot 3+ View Left            Plan of care was reviewed with patient at the conclusion of today's visit. Education was provided regarding diagnoses, management, prescribed or recommended OTC products, and the importance of compliance with follow-up appointments. The patient was counseled regarding the risks, benefits, and possible side-effects of treatment. I advised the patient to keep me informed of any acute changes in their status including new, worsening, or persistent symptoms. Patient expresses understanding and agreement with the management plan.        KAN Zhang

## 2023-10-27 ENCOUNTER — PATIENT ROUNDING (BHMG ONLY) (OUTPATIENT)
Dept: FAMILY MEDICINE CLINIC | Facility: CLINIC | Age: 23
End: 2023-10-27
Payer: COMMERCIAL

## 2023-11-13 ENCOUNTER — TELEPHONE (OUTPATIENT)
Dept: FAMILY MEDICINE CLINIC | Facility: CLINIC | Age: 23
End: 2023-11-13
Payer: COMMERCIAL

## 2023-11-13 DIAGNOSIS — S99.912A INJURY OF LEFT ANKLE, INITIAL ENCOUNTER: Primary | ICD-10-CM

## 2023-11-13 DIAGNOSIS — M25.572 ACUTE LEFT ANKLE PAIN: ICD-10-CM

## 2023-11-13 DIAGNOSIS — M79.672 LEFT FOOT PAIN: ICD-10-CM

## 2023-11-13 NOTE — TELEPHONE ENCOUNTER
Caller: Cookie Magana    Relationship: Self    Best call back number:  [phone] PLEASE CALL     Who are you requesting to speak with (clinical staff, provider,  specific staff member): CLINICAL    What was the call regarding: INFORMATION ABOUT A FOLLOW UP WITH AN ORTHOPEDIC SURGEON FOR HER LEFT ANKLE AND POSSIBLY AN MRI

## 2023-11-14 NOTE — TELEPHONE ENCOUNTER
She should be contacted within 1 week on her orthopedic referral. Advise her to notify the office if she has not been contacted. Thank you.

## 2023-11-16 ENCOUNTER — TELEPHONE (OUTPATIENT)
Dept: ORTHOPEDIC SURGERY | Facility: CLINIC | Age: 23
End: 2023-11-16

## 2023-11-16 NOTE — TELEPHONE ENCOUNTER
Caller: Cookie Magana    Relationship to patient: Self    Best call back number: [phone]    Patient is needing:  PATIENT WAS REFERRED BY SU[facility] TO DR. MONROY BUT OFFICE ADVISED SCHEDULING WITH DR. MARRERO AT [facility]. PATIENT REQUESTING TO BE SCHEDULED WITH DR. MONROY SINCE DR. MARRERO DOES NOT HAVE ANY REVIEWS. PLEASE ADVISE.

## 2023-11-30 ENCOUNTER — OFFICE VISIT (OUTPATIENT)
Age: 23
End: 2023-11-30
Payer: COMMERCIAL

## 2023-11-30 VITALS
SYSTOLIC BLOOD PRESSURE: 102 MMHG | HEIGHT: 68 IN | BODY MASS INDEX: 20.34 KG/M2 | DIASTOLIC BLOOD PRESSURE: 68 MMHG | WEIGHT: 134.2 LBS

## 2023-11-30 DIAGNOSIS — S93.492A SPRAIN OF ANTERIOR TALOFIBULAR LIGAMENT OF LEFT ANKLE, INITIAL ENCOUNTER: Primary | ICD-10-CM

## 2023-11-30 DIAGNOSIS — M95.8 OSTEOCHONDRAL DEFECT OF ANKLE: ICD-10-CM

## 2023-11-30 NOTE — PROGRESS NOTES
[facility] Visit     Office Visit       Date: 11/30/2023   Patient Name: Cookie Magana  MRN: [id number]  YOB: 2000    Referring Physician: Mitchell Quinn PA     Chief Complaint:   Chief Complaint   Patient presents with    Left Foot - Pain     History of Present Illness:   Cookie Magana is a 23 y.o. female who presents with new problem of: left foot pain.  Onset: twisting injury. The issue has been ongoing for 2.5 month(s) 9/8/23. Pain is a 3/10 on the pain scale. Pain is described as dull and aching. Associated symptoms include pain, swelling, and stiffness. The pain is worse with walking, sitting, climbing stairs, sleeping, and any movement of the joint; resting improve the pain. Previous treatments have included: NSAIDS.    Subjective   Review of Systems: Review of Systems   Constitutional:  Negative for chills, fever, unexpected weight gain and unexpected weight loss.   HENT:  Negative for congestion, postnasal drip and rhinorrhea.    Eyes:  Negative for blurred vision.   Respiratory:  Negative for shortness of breath.    Cardiovascular:  Negative for leg swelling.   Gastrointestinal:  Negative for abdominal pain, nausea and vomiting.   Genitourinary:  Negative for difficulty urinating.   Musculoskeletal:  Positive for arthralgias. Negative for gait problem, joint swelling and myalgias.   Skin:  Negative for skin lesions and wound.   Neurological:  Negative for dizziness, weakness, light-headedness and numbness.   Hematological:  Does not bruise/bleed easily.   Psychiatric/Behavioral:  Negative for depressed mood.    All other systems reviewed and are negative.       I have reviewed the following portions of the patient's history:History of Present Illness and review of systems.    Past Medical History:   Past Medical History:   Diagnosis Date    Anxiety     PMS (premenstrual syndrome)        Past Surgical History:   Past Surgical  "History:   Procedure Laterality Date    WISDOM TOOTH EXTRACTION         Family History:   Family History   Problem Relation Age of Onset    Hypertension Mother     Hyperlipidemia Mother     Coronary artery disease Father     Deep vein thrombosis Father     Hyperlipidemia Father     Breast cancer Neg Hx     Ovarian cancer Neg Hx        Social History:   Social History     Socioeconomic History    Marital status: Single   Tobacco Use    Smoking status: Never    Smokeless tobacco: Never   Vaping Use    Vaping Use: Never used   Substance and Sexual Activity    Alcohol use: Yes    Drug use: Never    Sexual activity: Yes     Partners: Male     Birth control/protection: Condom       Medications: No current outpatient medications on file.    Allergies:   Allergies   Allergen Reactions    Cefdinir Other (See Comments)       I reviewed the patient's chief complaint, history of present illness, review of systems, past medical history, surgical history, family history, social history, medications and allergy list.     Objective    Vital Signs:   Vitals:    11/30/23 0801   BP: 102/68   Weight: 60.9 kg (134 lb 3.2 oz)   Height: 171.5 cm (67.5\")     Body mass index is 20.71 kg/m².   BMI is within normal parameters. No other follow-up for BMI required.     Patient reports that she is a non-smoker and has not ever been a smoker.  This behavior was applauded and she was encouraged to continue in smoking cessation.  We will continue to monitor at subsequent visits.    Ortho Exam:  Constitutional: General Appearance: healthy-appearing, NAD, and normal body habitus.   Psychiatric: Orientation: oriented to time, place, and person. Mood and Affect: normal mood and affect and active and alert.   Cardiovascular System: Arterial Pulses Right: dorsalis pedis normal. Arterial Pulses Left: dorsalis pedis normal. Edema Right: no edema. Edema Left: no edema. Varicosities Right: no varicosities and capillary refill test normal. Varicosities Left: " no varicosities and capillary refill test normal.   Gait and Station: Appearance: normal gait, no limp, and ambulating with no assistive devices.   Ankles and Feet: Inspection Right: no erythema, induration, swelling, warmth, or deformity and normal alignment. Inspection Left: no erythema, induration, warmth, or deformity and normal alignment and swelling (lateral ankle). Bony Palpation of the Ankle/Foot Left: no tenderness of the sesamoids, the ankle, the calcaneal tuberosity, the metatarsals, the tarsometatarsal joints, the navicular tuberosity, the dome of talus, the head of talus, the inferior tibiofibular joint, or the achilles tendon insertion. Soft Tissue Palpation of the Ankle/Foot Left: no tenderness of the tibialis posterior, the tibialis anterior, the plantar fascia, the achilles tendon, the peroneus longus and brevis, the extensor hallucis longus, the sinus tarsi, the lateral anterior talofibular ligament, the posterior talofibular ligament, the peroneal retinaculum, the spring ligament, or the retrocalcaneal bursae and tenderness of the anterior talofibular ligament, the calcaneofibular ligament, and the deltoid ligament; tender over anterior joint line. Active Range of Motion Left: great toe flexion normal and extension normal and plantar flexion normal, inversion normal, eversion normal, and dorsiflexion (10 deg.). Stability Right: anterior drawer negative and talar tilt negative. Stability Left: anterior drawer grade 1+ and talar tilt grade 1+.   Neurological System: Sensation on the Right: normal at the lateral plantar nerve, the medial plantar nerve, the superficial peroneal nerve, the deep peroneal nerve, the sural nerve, and the saphenous nerve and normal distal extremities. Sensation on the Left: normal at the lateral plantar nerve, the medial plantar nerve, the superficial peroneal nerve, the deep peroneal nerve, the sural nerve, and the saphenous nerve and normal distal extremities. Special  Tests on the Right: Dickey's test negative, Hoffa's test negative, Tinel's test negative, external rotation test negative, and squeeze test negative. Special Tests on the Left: Dickey's test negative, Hoffa's test negative, Tinel's test negative, external rotation test negative, and squeeze test negative.   Skin: Right Lower Extremity: normal. Left Lower Extremity: normal.    Results Review:   Imaging Results (Last 24 Hours)       ** No results found for the last 24 hours. **        I personally viewed the radiographs of the left wrist and left ankle from 10/4/2023.  No acute fracture or dislocation.  No significant soft tissue swelling.    Procedures    Assessment / Plan    Assessment/Plan:   Diagnoses and all orders for this visit:    1. Sprain of anterior talofibular ligament of left ankle, initial encounter (Primary)  -     Ambulatory Referral to Physical Therapy Evaluate and treat, Ortho; Electrotherapy; Tens (Home), Iontophoresis, E-stim; Desensitization, Soft Tissue Mobilizaton, Cross Fiber; Stretching, Strengthening, ROM; Left (proprioception training); Full weig...  -     MRI Ankle Left Without Contrast; Future    2. Osteochondral defect of ankle  -     MRI Ankle Left Without Contrast; Future    Inversion mechanism left ankle injury that occurred on 9/8/2023.  Unfortunately, she has not healed well.  She is able to walk in a regular shoe but still has some persistent pain over the lateral malleolus as well as soft tissue swelling over the ATFL.  Acutely tender over the ATFL as well and pain with talar tilt.  Radiographs obtained 1 month prior did not show any acute osseous abnormality in the ankle or foot.  However, I am worried given the persistence of pain and swelling that she may have an osteochondral defect in the tibiotalar joint resulting from this injury.  I recommended that we obtain MRI of the left ankle to rule out this condition.  In the meantime, I would like her to get into physical  therapy and have provided her with a referral today.  This would include proprioception training.  Of encouraged her to continue icing the ankle to help with her swelling.  She can take Tylenol and/or ibuprofen as needed for pain control.  I will see her back after the MRI to discuss results and next [facility] treatment.    Previous imaging studies reviewed: 10/24/2023-radiographs of the left foot and left ankle.    Previous documentation reviewed: 10/24/2023-microparticle PA-office visit.    Previous laboratory results reviewed: 11/4/2022-hemoglobin A1c 5.6%.  Multiple    Follow Up:   Return for MRI LEFT ANKLE REVIEW.      Eugene Jones MD  [facility]

## 2023-12-18 ENCOUNTER — TELEPHONE (OUTPATIENT)
Dept: ORTHOPEDIC SURGERY | Facility: CLINIC | Age: 23
End: 2023-12-18
Payer: COMMERCIAL

## 2023-12-18 NOTE — TELEPHONE ENCOUNTER
Caller: KAREN    Relationship: [place] [facility] PHYSICAL THERAPY IN [facility]     Best call back number: [phone]    What is the medical concern/diagnosis: LEFT ANKLE SPRAIN    What specialty or service is being requested: PHYSICAL THERAPY    What is the provider, practice or medical service name: [place] [facility]AP[facility]    What is the office location:  [address], [place] [address]    What is the office phone number: [phone]

## 2023-12-18 NOTE — TELEPHONE ENCOUNTER
Faxed PT order within [facility] to [phone].     Ho WALLS CMA ([facility]), ROT

## 2023-12-21 ENCOUNTER — TELEPHONE (OUTPATIENT)
Dept: ORTHOPEDIC SURGERY | Facility: CLINIC | Age: 23
End: 2023-12-21

## 2023-12-21 NOTE — TELEPHONE ENCOUNTER
Caller: Cookie Magana    Relationship: Self    Best call back number: [phone]    What orders are you requesting (i.e. lab or imaging): MRI LEFT ANKLE     In what timeframe would the patient need to come in: ASAP     Where will you receive your lab/imaging services: [facility] ON [address] - PATIENT WOULD LIKE A CALL WHEN THE ORDER IS FAXED - DOES NOT WANT TO GO TO [facility]- PLEASE CANCEL APPT ON 12/24/2023

## 2023-12-28 DIAGNOSIS — S93.492A SPRAIN OF ANTERIOR TALOFIBULAR LIGAMENT OF LEFT ANKLE, INITIAL ENCOUNTER: ICD-10-CM

## 2023-12-28 DIAGNOSIS — M95.8 OSTEOCHONDRAL DEFECT OF ANKLE: ICD-10-CM

## 2024-01-10 ENCOUNTER — OFFICE VISIT (OUTPATIENT)
Age: 24
End: 2024-01-10
Payer: COMMERCIAL

## 2024-01-10 VITALS
BODY MASS INDEX: 20.28 KG/M2 | SYSTOLIC BLOOD PRESSURE: 122 MMHG | WEIGHT: 133.8 LBS | DIASTOLIC BLOOD PRESSURE: 80 MMHG | HEIGHT: 68 IN

## 2024-01-10 DIAGNOSIS — S93.492D SPRAIN OF ANTERIOR TALOFIBULAR LIGAMENT OF LEFT ANKLE, SUBSEQUENT ENCOUNTER: Primary | ICD-10-CM

## 2024-01-10 NOTE — PROGRESS NOTES
[facility] Office Follow Up Visit     Office Follow Up      Date: 01/10/2024   Patient Name: Cookie Magana  MRN: [id number]  YOB: 2000    Referring Physician: No ref. provider found     Chief Complaint:   Chief Complaint   Patient presents with    Follow-up     6 week MRI (12/28/23) recheck - Sprain of anterior talofibular ligament of left ankle & Osteochondral defect of ankle     History of Present Illness: Cookie Magana is a 23 y.o. female who is here today for follow up on left ankle pain is a 3/10.  Overall unchanged but certainly not worse than last visit.  MRI of the left ankle has been obtained.  She is here today to discuss those results.    Sub sprain.  3 to 4 months since the time of injury.  Has been to 2 physical therapy sessions since last visit.jective   Review of Systems: Review of Systems   Constitutional:  Negative for chills, fever, unexpected weight gain and unexpected weight loss.   HENT:  Negative for congestion, postnasal drip and rhinorrhea.    Eyes:  Negative for blurred vision.   Respiratory:  Negative for shortness of breath.    Cardiovascular:  Negative for leg swelling.   Gastrointestinal:  Negative for abdominal pain, nausea and vomiting.   Genitourinary:  Negative for difficulty urinating.   Musculoskeletal:  Positive for arthralgias. Negative for gait problem, joint swelling and myalgias.   Skin:  Negative for skin lesions and wound.   Neurological:  Negative for dizziness, weakness, light-headedness and numbness.   Hematological:  Does not bruise/bleed easily.   Psychiatric/Behavioral:  Negative for depressed mood.    All other systems reviewed and are negative.       Medications: No current outpatient medications on file.    Allergies:   Allergies   Allergen Reactions    Cefdinir Other (See Comments)       I have reviewed and updated the patient's chief complaint, history of present illness, review of systems,  "past medical history, surgical history, family history, social history, medications and allergy list as appropriate.     Objective    Vital Signs:   Vitals:    01/10/24 1310   BP: 122/80   Weight: 60.7 kg (133 lb 12.8 oz)   Height: 172.7 cm (68\")     Body mass index is 20.34 kg/m².  BMI is within normal parameters. No other follow-up for BMI required.    Patient reports that she is a non-smoker and has not ever been a smoker.  This behavior was applauded and she was encouraged to continue in smoking cessation.  We will continue to monitor at subsequent visits.     Ortho Exam:  Constitutional: General Appearance: healthy-appearing, NAD, and normal body habitus.   Psychiatric: Mood and Affect: normal mood and affect and active and alert.   Cardiovascular System: Arterial Pulses Right: posterior tibialis normal and dorsalis pedis normal. Edema Right: no edema. Varicosities Right: no varicosities and capillary refill test normal.   Gait and Station: Appearance: normal gait, no limp, and ambulating with no assistive devices.   Ankles and Feet: Inspection Left: no erythema, induration, swelling, warmth, or deformity and normal alignment. Bony Palpation of the Ankle/Foot Left: no tenderness of the sesamoids, the ankle, the calcaneal tuberosity, the metatarsals, the tarsometatarsal joints, the navicular tuberosity, the dome of talus, the head of talus, the inferior tibiofibular joint, or the achilles tendon insertion. Soft Tissue Palpation of the Ankle/Foot Left: no tenderness of the tibialis posterior, the tibialis anterior, the plantar fascia, the achilles tendon, the peroneus longus and brevis, the extensor hallucis longus, the sinus tarsi, the lateral anterior talofibular ligament, the anterior talofibular ligament, the calcaneofibular ligament, the posterior talofibular ligament, the peroneal retinaculum, the deltoid ligament, the spring ligament, or the retrocalcaneal bursae. Active Range of Motion Left: great toe " flexion normal and extension normal and dorsiflexion normal, plantar flexion normal, inversion normal, and eversion normal. Stability Left: anterior drawer negative and talar tilt negative. Strength Left: extensor digitorum longus (5/5) and brevis (5/5); extensor hallucis longus (5/5); peroneus longus (5/5) and brevis (5/5); and posterior tibialis (5/5), tibialis anterior (5/5), and gastrocnemius (5/5). Inspection of the Toes Left: no callus, claw toes, or hammer toes. Palpation and Stability of the Toes Left: no tenderness of the great toe, the second toe, the third toe, the fourth toe, or the fifth toe and anterior drawer negative.   Skin: Left Lower Extremity: normal.     Results Review:   Imaging Results (Last 24 Hours)       ** No results found for the last 24 hours. **        MRI of the left ankle personally reviewed and interpreted by me.  No evidence of osteochondral defect.  No syndesmotic injury.  There is complete tearing of the anterior talofibular ligament.    Procedures    Assessment / Plan    Assessment/Plan:   Diagnoses and all orders for this visit:    1. Sprain of anterior talofibular ligament of left ankle, subsequent encounter (Primary)    MRI shows tearing of the ATFL.  No OCD.  Exam improved today.  Minimal swelling.  No pain.  Negative anterior drawer.  Recommend continuing physical therapy for period of 3 to 6 weeks going twice per week.  Follow-up if symptoms not improved after that 6-week.  Otherwise only as needed.    Follow Up:   Return if symptoms worsen or fail to improve.      Eugene Jones MD  [facility]

## 2024-05-23 ENCOUNTER — OFFICE VISIT (OUTPATIENT)
Dept: OBSTETRICS AND GYNECOLOGY | Facility: CLINIC | Age: 24
End: 2024-05-23
Payer: COMMERCIAL

## 2024-05-23 VITALS
WEIGHT: 141.8 LBS | BODY MASS INDEX: 21.49 KG/M2 | SYSTOLIC BLOOD PRESSURE: 108 MMHG | HEIGHT: 68 IN | DIASTOLIC BLOOD PRESSURE: 70 MMHG

## 2024-05-23 DIAGNOSIS — Z01.419 WOMEN'S ANNUAL ROUTINE GYNECOLOGICAL EXAMINATION: Primary | ICD-10-CM

## 2024-05-23 DIAGNOSIS — Z11.3 SCREENING EXAMINATION FOR STD (SEXUALLY TRANSMITTED DISEASE): ICD-10-CM

## 2024-05-23 NOTE — PROGRESS NOTES
Gynecologic Exam Note      Chief Complaint   Patient presents with    Gynecologic Exam    Vulvar bump         Subjective     HPI  Cookie Magana is a 23 y.o. female, , who presents with evaluation of vulvar lump that is initial.      She states she has experienced this problem for 1 month.  She describes the severity as moderate pain- tender to the touch. She states she had a new sexual partner over a month before the initial vulvar bump appeared. It lasted for about 4 days then went away. It was tender to touch and capsule shaped. She stated it looked like it was not fluid filled. It then reappeared 1 week ago in the same spot and lasted for 4 days. She states that the problem is currently resolved.  Patient notes aggravating factors include touch and alleviating factors was Neosporin- took away some of the pain.  The patient reports additional symptoms as none.  The patient has not previously been evaluated for vulvar issues.    Her last LMP was Patient's last menstrual period was 2024 (exact date)..   Partner Status: Marital Status: single.  New Partners since last visit: yes.  Desires STD Screening: yes.    Any use of Cottonelle/Bathroom Wipes: yes      Additional OB/GYN History   Last Pap : 2022  Last Completed Pap Smear            Ordered - PAP SMEAR (Every 3 Years) Ordered on 2022  LIQUID-BASED PAP SMEAR, [facility] ([facility])    10/16/2020  SCANNED - PAP SMEAR    2020  Pap IG, Ct-Ng TV Rfx HPV ASCU                  History of abnormal Pap smear: yes - -ASCUS, HPV negative   Tobacco Usage?: No   OB History          0    Para   0    Term   0       0    AB   0    Living   0         SAB   0    IAB   0    Ectopic   0    Molar   0    Multiple   0    Live Births   0                The additional following portions of the patient's history were reviewed and updated as appropriate: allergies, current medications, past family history, past  "medical history, past social history, past surgical history, and problem list.    Review of Systems   Constitutional: Negative.    HENT: Negative.     Eyes: Negative.    Respiratory: Negative.     Cardiovascular: Negative.    Gastrointestinal: Negative.    Endocrine: Negative.    Genitourinary:  Positive for genital sores.   Musculoskeletal: Negative.    Skin: Negative.    Allergic/Immunologic: Negative.    Neurological: Negative.    Hematological: Negative.    Psychiatric/Behavioral: Negative.         Past Medical History:   Diagnosis Date    Anxiety     PMS (premenstrual syndrome)        Past Surgical History:   Procedure Laterality Date    WISDOM TOOTH EXTRACTION         I have reviewed and agree with the HPI, ROS, and historical information as entered above. Carmel Iglesias MD    Objective   /70   Ht 172.7 cm (68\")   Wt 64.3 kg (141 lb 12.8 oz)   LMP 05/01/2024 (Exact Date)   BMI 21.56 kg/m²     Physical Exam    Assessment & Plan     Assessment     Problem List Items Addressed This Visit    None  Visit Diagnoses       Women's annual routine gynecological examination    -  Primary    Relevant Orders    LIQUID-BASED PAP SMEAR WITH HPV GENOTYPING REGARDLESS OF INTERPRETATION ([facility])    Screening examination for STD (sexually transmitted disease)        Relevant Orders    LIQUID-BASED PAP SMEAR WITH HPV GENOTYPING REGARDLESS OF INTERPRETATION ([facility])              Plan     {Vulvar Plan:28105}  I explained to Cookie to avoid products with fragrance or oil the directly contacts the skin or comes in contact with the skin through clothing with contact of the vulva.  I encouraged her to examine all products used for cleansing and to move to hypoallergenic, unscented products.  We discussed avoiding any bathroom wipes such as Cottonelle, any soaps, bubble baths, or products to the vulva.  She may use only aquaphor or vaseline.  Avoid douching or other vaginal medications/products.        Carmel Iglesias, " MD  05/23/2024

## 2024-05-23 NOTE — PROGRESS NOTES
Gynecologic Annual Exam Note        CC- Here for annual exam.     Subjective     HPI  Cookie Magana is a 23 y.o. female established patient who presents for annual well woman exam. Patient's last menstrual period was 2024 (exact date)..  Her periods are regular every 28-30 days, lasting 4 days and are heavy and clots are absent.  She reports severe dysmenorrhea for the first day.  SPartner Status: Marital Status: single.  New Partners since last visit: YES/NO/Other: yes.  Condom usage with IC: always.    The patient has any complaints today- Vulvar bump. She had a new sexual partner over a month before the initial vulvar bump appeared. She reports a bump on RT outer labia that first appeared 4 weeks ago. It was present for 4 days and then went away on it's own. It reappears in the same spot last week and lasted for about 4 days again, then went away. She states it's very tender to the touch and capsule shaped. She stated it did not look like it was fluid-filled. She currently doesn't think she has the bump.    She exercises regularly: yes.  She has concerns about domestic violence: no.    OB History          0    Para   0    Term   0       0    AB   0    Living   0         SAB   0    IAB   0    Ectopic   0    Molar   0    Multiple   0    Live Births   0                Current contraception: condoms  History of abnormal Pap smear: no  Family history of uterine, colon or ovarian cancer: no  Family history of breast cancer: no  H/o STDs: no  Last pap:2022- ASCUS, neg HPV  Gardasil:completed    Last Pap : 2022  Last Completed Pap Smear            Ordered - PAP SMEAR (Every 3 Years) Ordered on 2022  LIQUID-BASED PAP SMEAR, [facility] ([facility])    10/16/2020  SCANNED - PAP SMEAR    2020  Pap IG, Ct-Ng TV Rfx HPV ASCU                    Health Maintenance   Topic Date Due    HPV VACCINES (1 - 3-dose series) Never done    HEPATITIS C SCREENING  Never done  "   ANNUAL PHYSICAL  Never done    Annual Gynecologic Pelvic and Breast Exam  12/08/2023    INFLUENZA VACCINE  08/01/2024    PAP SMEAR  12/07/2025    TDAP/TD VACCINES (2 - Td or Tdap) 08/05/2029    COVID-19 Vaccine  Completed    Pneumococcal Vaccine 0-64  Aged Out    CHLAMYDIA SCREENING  Discontinued       The following portions of the patient's history were reviewed and updated as appropriate: allergies, current medications, past family history, past medical history, past social history, past surgical history, and problem list.    Review of Systems  Review of Systems   Constitutional: Negative.    HENT: Negative.     Eyes: Negative.    Respiratory: Negative.     Cardiovascular: Negative.    Gastrointestinal: Negative.    Endocrine: Negative.    Genitourinary:  Positive for genital sores.   Musculoskeletal: Negative.    Skin: Negative.    Allergic/Immunologic: Negative.    Neurological: Negative.    Hematological: Negative.    Psychiatric/Behavioral: Negative.         I have reviewed and agree with the HPI, ROS, and historical information as entered above. Carmel Iglesias MD      Past Medical History:   Diagnosis Date    Anxiety     PMS (premenstrual syndrome)         Past Surgical History:   Procedure Laterality Date    WISDOM TOOTH EXTRACTION            Objective   /70   Ht 172.7 cm (68\")   Wt 64.3 kg (141 lb 12.8 oz)   LMP 05/01/2024 (Exact Date)   BMI 21.56 kg/m²     Physical Exam  Vitals and nursing note reviewed. Exam conducted with a chaperone present.   Constitutional:       General: She is awake.   HENT:      Head: Normocephalic and atraumatic.   Neck:      Thyroid: No thyroid mass, thyromegaly or thyroid tenderness.   Cardiovascular:      Rate and Rhythm: Normal rate.      Heart sounds: No murmur heard.     No friction rub. No gallop.   Pulmonary:      Effort: Pulmonary effort is normal.      Breath sounds: Normal breath sounds. No stridor. No wheezing, rhonchi or rales.   Chest:      Chest wall: " No mass or tenderness.   Breasts:     Right: Normal. No bleeding, inverted nipple, mass, nipple discharge, skin change or tenderness.      Left: Normal. No bleeding, inverted nipple, mass, nipple discharge, skin change or tenderness.   Abdominal:      Palpations: Abdomen is soft.      Tenderness: There is no guarding or rebound.      Hernia: There is no hernia in the left inguinal area or right inguinal area.   Genitourinary:     General: Normal vulva.      Pubic Area: No rash.       Labia:         Right: Lesion (there is very minimal induration noted at the site of prior lesion, this is no longer raised and skin is normal in appearance without sign of infection) present. No rash, tenderness or injury.         Left: No rash, tenderness, lesion or injury.       Urethra: No prolapse, urethral swelling or urethral lesion.      Vagina: No foreign body. No vaginal discharge, erythema, tenderness, bleeding or lesions.      Cervix: No cervical motion tenderness, discharge, friability, lesion, erythema or cervical bleeding.      Uterus: Normal. Not deviated, not enlarged, not fixed and not tender.       Adnexa: Right adnexa normal and left adnexa normal.        Right: No mass, tenderness or fullness.          Left: No mass, tenderness or fullness.        Rectum: No external hemorrhoid.       Musculoskeletal:      Cervical back: Normal range of motion.   Lymphadenopathy:      Upper Body:      Right upper body: No supraclavicular or axillary adenopathy.      Left upper body: No supraclavicular or axillary adenopathy.   Skin:     General: Skin is warm.   Neurological:      Mental Status: She is alert and oriented to person, place, and time.   Psychiatric:         Mood and Affect: Mood and affect normal.         Speech: Speech normal.          Assessment   Assessment  Problem List Items Addressed This Visit    None  Visit Diagnoses       Women's annual routine gynecological examination    -  Primary    Relevant Orders     LIQUID-BASED PAP SMEAR WITH HPV GENOTYPING REGARDLESS OF INTERPRETATION ([facility])    Screening examination for STD (sexually transmitted disease)        Relevant Orders    LIQUID-BASED PAP SMEAR WITH HPV GENOTYPING REGARDLESS OF INTERPRETATION ([facility])              Assessment     Problem List Items Addressed This Visit    None  Visit Diagnoses       Women's annual routine gynecological examination    -  Primary    Relevant Orders    LIQUID-BASED PAP SMEAR WITH HPV GENOTYPING REGARDLESS OF INTERPRETATION ([facility])    Screening examination for STD (sexually transmitted disease)        Relevant Orders    LIQUID-BASED PAP SMEAR WITH HPV GENOTYPING REGARDLESS OF INTERPRETATION ([facility])              Plan     Reviewed monthly self breast exams.  Instructed to call with lumps, pain, or breast discharge.    RTC in 1 year or PRN with problems  Happy with condoms for now.  Call if the lesion returns so we can evaluate it.    Pt s/p Gardasil         Carmel Iglesias MD  05/23/2024

## 2024-05-30 LAB — REF LAB TEST METHOD: NORMAL

## 2025-02-21 ENCOUNTER — OFFICE VISIT (OUTPATIENT)
Dept: OBSTETRICS AND GYNECOLOGY | Facility: CLINIC | Age: 25
End: 2025-02-21
Payer: COMMERCIAL

## 2025-02-21 VITALS
HEIGHT: 68 IN | DIASTOLIC BLOOD PRESSURE: 70 MMHG | WEIGHT: 140.6 LBS | BODY MASS INDEX: 21.31 KG/M2 | SYSTOLIC BLOOD PRESSURE: 124 MMHG

## 2025-02-21 DIAGNOSIS — Z30.015 ENCOUNTER FOR INITIAL PRESCRIPTION OF VAGINAL RING HORMONAL CONTRACEPTIVE: ICD-10-CM

## 2025-02-21 DIAGNOSIS — Z30.9 ENCOUNTER FOR CONTRACEPTIVE MANAGEMENT, UNSPECIFIED TYPE: Primary | ICD-10-CM

## 2025-02-21 DIAGNOSIS — F41.9 ANXIETY: ICD-10-CM

## 2025-02-21 LAB
B-HCG UR QL: NEGATIVE
EXPIRATION DATE: NORMAL
INTERNAL NEGATIVE CONTROL: NEGATIVE
INTERNAL POSITIVE CONTROL: POSITIVE
Lab: NORMAL

## 2025-02-21 RX ORDER — ETONOGESTREL AND ETHINYL ESTRADIOL VAGINAL RING .015; .12 MG/D; MG/D
RING VAGINAL
Qty: 3 EACH | Refills: 0 | Status: SHIPPED | OUTPATIENT
Start: 2025-02-21

## 2025-02-21 NOTE — PROGRESS NOTES
Chief Complaint   Patient presents with    Follow-up         Subjective   HPI  Cookie Magana is a 24 y.o. female, , LMP was on Patient's last menstrual period was 2025 (exact date). who presents to discuss options for birth control. She has been on OCP's in the past. She did do well with this in the past. She would like to discuss Nexplanon for birth control.The patient's contraceptive methods: Condoms. She is not satisfied with her current method of birth control due to anxiety the week before her period.     The patient would like to discuss the following complaints today: BC options    Her periods occur every 28 days, lasting 6 days. . The flow is moderate. She reports dysmenorrhea is severe occurring first 1-2 days of flow.     Marital Status: single. She is sexually active. She has not had new partners.. Recommendations for STD testing has been reviewed with the patient and she declines about STD testing today.    Additional OB/GYN History   Thromboembolic Disease: none  History of hypertension: no  History of migraines: no  Age of menarche: 12  Tobacco Usage?: No     Last Pap : 24. Results: negative. HPV: negative.   Last Completed Pap Smear            PAP SMEAR (Every 3 Years) Next due on 2024  LIQUID-BASED PAP SMEAR WITH HPV GENOTYPING REGARDLESS OF INTERPRETATION ([facility])    2022  LIQUID-BASED PAP SMEAR, [facility] ([facility])    10/16/2020  SCANNED - PAP SMEAR    2020  Pap IG, Ct-Ng TV Rfx HPV ASCU                    History of abnormal Pap smear: no      Current Outpatient Medications:     etonogestrel-ethinyl estradiol (NuvaRing) 0.12-0.015 MG/24HR vaginal ring, Insert vaginally and leave in place for 3 consecutive weeks, then remove for 1 week., Disp: 3 each, Rfl: 0    sertraline (Zoloft) 50 MG tablet, 1/2 tab qd x 2 weeks.  Then increase to 1 tab if needed., Disp: 30 tablet, Rfl: 2     Past Medical History:   Diagnosis Date    Anxiety   "   PMS (premenstrual syndrome)         Past Surgical History:   Procedure Laterality Date    WISDOM TOOTH EXTRACTION         The additional following portions of the patient's history were reviewed and updated as appropriate: allergies and current medications.    Review of Systems   Constitutional: Negative.    HENT: Negative.     Eyes: Negative.    Respiratory: Negative.     Cardiovascular: Negative.    Gastrointestinal: Negative.    Endocrine: Negative.    Genitourinary: Negative.    Musculoskeletal: Negative.    Skin: Negative.    Allergic/Immunologic: Negative.    Neurological: Negative.    Hematological: Negative.    Psychiatric/Behavioral:  The patient is nervous/anxious.        I have reviewed and agree with the HPI, ROS, and historical information as entered above. Ynes YUSEF Winter, APRN      Objective   /70   Ht 172.7 cm (68\")   Wt 63.8 kg (140 lb 9.6 oz)   LMP 02/17/2025 (Exact Date)   BMI 21.38 kg/m²     Physical Exam  Vitals and nursing note reviewed.   Constitutional:       General: She is not in acute distress.     Appearance: Normal appearance. She is not ill-appearing, toxic-appearing or diaphoretic.   Pulmonary:      Effort: Pulmonary effort is normal.   Abdominal:      Palpations: Abdomen is soft.   Neurological:      Mental Status: She is alert.   Psychiatric:         Attention and Perception: Attention normal.         Mood and Affect: Mood is anxious.         Speech: Speech normal.         Behavior: Behavior normal. Behavior is cooperative.         Thought Content: Thought content normal.         Cognition and Memory: Cognition normal.         Judgment: Judgment normal.         Assessment & Plan     Assessment     Problem List Items Addressed This Visit          Mental Health    Anxiety    Relevant Medications    sertraline (Zoloft) 50 MG tablet     Other Visit Diagnoses       Encounter for contraceptive management, unspecified type    -  Primary    Relevant Orders    POC Pregnancy, Urine " (Completed)    Encounter for initial prescription of vaginal ring hormonal contraceptive        Relevant Medications    etonogestrel-ethinyl estradiol (NuvaRing) 0.12-0.015 MG/24HR vaginal ring            Lab(s) Ordered  Medication(s) Ordered  Counseling on alternative methods of birth control provided  Counseling on use of oral contraceptives provided  Counseling on use of diaphragm provided  Counseling on prevention of sexually transmitted diseases provided  Counseling on use of IUDs provided  Reviewed importance of medication compliance , birth control counseling including side effects, and safe sex  UPT neg  We discussed starting Zoloft at 25mg (half pill) for 2 weeks then increase to 50mg (whole pill) if she tolerates it. We discussed side effects of medication including suicide thoughts, increasing anxiety/depression-stop immediately and call office.  Plans to see therapist for anxiety/ocd.   Follow up in 3 months on nuvaring/zoloft      Ynes Winter, APRN  02/21/2025

## 2025-05-13 DIAGNOSIS — Z30.015 ENCOUNTER FOR INITIAL PRESCRIPTION OF VAGINAL RING HORMONAL CONTRACEPTIVE: ICD-10-CM

## 2025-06-05 ENCOUNTER — OFFICE VISIT (OUTPATIENT)
Dept: OBSTETRICS AND GYNECOLOGY | Facility: CLINIC | Age: 25
End: 2025-06-05
Payer: COMMERCIAL

## 2025-06-05 VITALS
DIASTOLIC BLOOD PRESSURE: 78 MMHG | SYSTOLIC BLOOD PRESSURE: 116 MMHG | BODY MASS INDEX: 21.07 KG/M2 | HEIGHT: 68 IN | WEIGHT: 139 LBS

## 2025-06-05 DIAGNOSIS — Z30.40 ENCOUNTER FOR REFILL OF PRESCRIPTION FOR CONTRACEPTION: ICD-10-CM

## 2025-06-05 DIAGNOSIS — Z01.419 WOMEN'S ANNUAL ROUTINE GYNECOLOGICAL EXAMINATION: Primary | ICD-10-CM

## 2025-06-05 RX ORDER — ETONOGESTREL AND ETHINYL ESTRADIOL .015; .12 MG/D; MG/D
INSERT, EXTENDED RELEASE VAGINAL SEE ADMIN INSTRUCTIONS
Qty: 3 EACH | Refills: 0 | OUTPATIENT
Start: 2025-06-05

## 2025-06-05 RX ORDER — ATOMOXETINE 40 MG/1
40 CAPSULE ORAL DAILY
COMMUNITY
Start: 2025-06-03

## 2025-06-05 RX ORDER — ETONOGESTREL AND ETHINYL ESTRADIOL VAGINAL RING .015; .12 MG/D; MG/D
RING VAGINAL
Qty: 9 EACH | Refills: 3 | Status: SHIPPED | OUTPATIENT
Start: 2025-06-05

## 2025-06-05 NOTE — PROGRESS NOTES
Gynecologic Annual Exam Note        CC- Here for annual exam.     Subjective     HPI  Cookie Magana is a 24 y.o. female established patient who presents for annual well woman exam. Patient's last menstrual period was 2025 (exact date)..  Her periods are regular every 28-30 days, lasting 5 days and are moderate and clots are absent.  She denies  dysmenorrhea. Partner Status: Marital Status: single.  New Partners since last visit: YES/NO/Other: no.      The patient does not have any complaints today.    She exercises regularly: yes.  She has concerns about domestic violence: no.    OB History          0    Para   0    Term   0       0    AB   0    Living   0         SAB   0    IAB   0    Ectopic   0    Molar   0    Multiple   0    Live Births   0                Current contraception: NuvaRing vaginal inserts  History of abnormal Pap smear: yes -  normal followup  Family history of uterine, colon or ovarian cancer: no  Family history of breast cancer: no  H/o STDs: none  Last pap:2024 Negative, HPV Negative   Gardasil:completed    Last Pap :   Last Completed Pap Smear            Upcoming       PAP SMEAR (Every 3 Years) Next due on 2024  LIQUID-BASED PAP SMEAR WITH HPV GENOTYPING REGARDLESS OF INTERPRETATION ([facility])    2022  LIQUID-BASED PAP SMEAR, [facility] ([facility])    10/16/2020  SCANNED - PAP SMEAR    2020  Pap IG, Ct-Ng TV Rfx HPV ASCU                            Health Maintenance   Topic Date Due    HPV VACCINES (1 - 3-dose series) Never done    HEPATITIS C SCREENING  Never done    ANNUAL PHYSICAL  Never done    Annual Gynecologic Pelvic and Breast Exam  2025    INFLUENZA VACCINE  2025    PAP SMEAR  2027    TDAP/TD VACCINES (3 - Td or Tdap) 2034    COVID-19 Vaccine  Completed    Pneumococcal Vaccine 0-49  Aged Out    CHLAMYDIA SCREENING  Discontinued       The following portions of the patient's history were  "reviewed and updated as appropriate: allergies, current medications, past family history, past medical history, past social history, past surgical history, and problem list.    Review of Systems  Review of Systems   Constitutional: Negative.    HENT: Negative.     Eyes: Negative.    Respiratory: Negative.     Cardiovascular: Negative.    Gastrointestinal: Negative.    Endocrine: Negative.    Genitourinary: Negative.    Musculoskeletal: Negative.    Skin: Negative.    Allergic/Immunologic: Negative.    Neurological: Negative.    Hematological: Negative.    Psychiatric/Behavioral: Negative.         I have reviewed and agree with the HPI, ROS, and historical information as entered above. Carmel Iglesias MD      Past Medical History:   Diagnosis Date    Anxiety     PMS (premenstrual syndrome)         Past Surgical History:   Procedure Laterality Date    WISDOM TOOTH EXTRACTION            Objective   /78 (BP Location: Right arm)   Ht 172.7 cm (68\")   Wt 63 kg (139 lb)   LMP 05/19/2025 (Exact Date)   BMI 21.13 kg/m²     Physical Exam  Vitals and nursing note reviewed. Exam conducted with a chaperone present.   Constitutional:       General: She is awake.   HENT:      Head: Normocephalic and atraumatic.   Neck:      Thyroid: No thyroid mass, thyromegaly or thyroid tenderness.   Cardiovascular:      Rate and Rhythm: Normal rate.      Heart sounds: No murmur heard.     No friction rub. No gallop.   Pulmonary:      Effort: Pulmonary effort is normal.      Breath sounds: Normal breath sounds. No stridor. No wheezing, rhonchi or rales.   Chest:      Chest wall: No mass or tenderness.   Breasts:     Right: Normal. No bleeding, inverted nipple, mass, nipple discharge, skin change or tenderness.      Left: Normal. No bleeding, inverted nipple, mass, nipple discharge, skin change or tenderness.   Abdominal:      Palpations: Abdomen is soft.      Tenderness: There is no guarding or rebound.      Hernia: There is no hernia " in the left inguinal area or right inguinal area.   Genitourinary:     General: Normal vulva.      Pubic Area: No rash.       Labia:         Right: No rash, tenderness, lesion or injury.         Left: No rash, tenderness, lesion or injury.       Urethra: No prolapse, urethral swelling or urethral lesion.      Vagina: No foreign body. No vaginal discharge, erythema, tenderness, bleeding or lesions.      Cervix: No cervical motion tenderness, discharge, friability, lesion, erythema or cervical bleeding.      Uterus: Normal. Not deviated, not enlarged, not fixed and not tender.       Adnexa: Right adnexa normal and left adnexa normal.        Right: No mass, tenderness or fullness.          Left: No mass, tenderness or fullness.        Rectum: No external hemorrhoid.   Musculoskeletal:      Cervical back: Normal range of motion.   Lymphadenopathy:      Upper Body:      Right upper body: No supraclavicular or axillary adenopathy.      Left upper body: No supraclavicular or axillary adenopathy.   Skin:     General: Skin is warm.   Neurological:      Mental Status: She is alert and oriented to person, place, and time.   Psychiatric:         Mood and Affect: Mood and affect normal.         Speech: Speech normal.          Assessment   Assessment  Problem List Items Addressed This Visit    None  Visit Diagnoses         Women's annual routine gynecological examination    -  Primary      Encounter for refill of prescription for contraception        Relevant Medications    etonogestrel-ethinyl estradiol (NuvaRing) 0.12-0.015 MG/24HR vaginal ring              Assessment     Problem List Items Addressed This Visit    None  Visit Diagnoses         Women's annual routine gynecological examination    -  Primary      Encounter for refill of prescription for contraception        Relevant Medications    etonogestrel-ethinyl estradiol (NuvaRing) 0.12-0.015 MG/24HR vaginal ring              Plan     Encouraged use of condoms for STD  prevention.  Reviewed monthly self breast exams.  Instructed to call with lumps, pain, or breast discharge.    RTC in 1 year or PRN with problems  Doing well on Nuvaring  Moving to [place]!!         Carmel Iglesias MD  06/05/2025

## 2025-07-21 ENCOUNTER — TELEPHONE (OUTPATIENT)
Dept: FAMILY MEDICINE CLINIC | Facility: CLINIC | Age: 25
End: 2025-07-21

## 2025-07-22 ENCOUNTER — OFFICE VISIT (OUTPATIENT)
Dept: FAMILY MEDICINE CLINIC | Facility: CLINIC | Age: 25
End: 2025-07-22
Payer: COMMERCIAL

## 2025-07-22 VITALS
HEIGHT: 68 IN | OXYGEN SATURATION: 99 % | SYSTOLIC BLOOD PRESSURE: 130 MMHG | HEART RATE: 96 BPM | BODY MASS INDEX: 20.19 KG/M2 | DIASTOLIC BLOOD PRESSURE: 89 MMHG | WEIGHT: 133.2 LBS

## 2025-07-22 DIAGNOSIS — Z13.220 SCREENING CHOLESTEROL LEVEL: ICD-10-CM

## 2025-07-22 DIAGNOSIS — Z13.29 THYROID DISORDER SCREENING: ICD-10-CM

## 2025-07-22 DIAGNOSIS — Z00.00 ENCOUNTER FOR ANNUAL WELLNESS VISIT: Primary | ICD-10-CM

## 2025-07-22 DIAGNOSIS — Z11.59 NEED FOR HEPATITIS C SCREENING TEST: ICD-10-CM

## 2025-07-22 DIAGNOSIS — F41.9 ANXIETY: ICD-10-CM

## 2025-07-22 PROCEDURE — 99395 PREV VISIT EST AGE 18-39: CPT | Performed by: NURSE PRACTITIONER

## 2025-07-22 NOTE — PROGRESS NOTES
Male Physical Exam     Patient Name: Cookie Magana  : 2000   MRN: [id number]   Care Team: Patient Care Team:  Mitchell Quinn PA-C as PCP - General (Physician Assistant)    Chief Complaint:    Chief Complaint   Patient presents with    Annual Exam     yearly       History of Present Illness: Cookie Magana is a 24 y.o. female who is here today for a physical examination.  ***    Subjective      Review of Systems:   Review of Systems    Past Medical History:   Past Medical History:   Diagnosis Date    ADHD (attention deficit hyperactivity disorder) 2025    Anxiety     PMS (premenstrual syndrome)        Past Surgical History:   Past Surgical History:   Procedure Laterality Date    WISDOM TOOTH EXTRACTION         Family History:   Family History   Problem Relation Age of Onset    Coronary artery disease Father     Deep vein thrombosis Father     Hyperlipidemia Father     Learning disabilities Father     Hypertension Mother     Hyperlipidemia Mother     Alcohol abuse Maternal Uncle     Depression Sister     Breast cancer Neg Hx     Ovarian cancer Neg Hx     Uterine cancer Neg Hx     Colon cancer Neg Hx        Social History:   Social History     Socioeconomic History    Marital status: Single   Tobacco Use    Smoking status: Never    Smokeless tobacco: Never   Vaping Use    Vaping status: Never Used   Substance and Sexual Activity    Alcohol use: Yes     Alcohol/week: 2.0 standard drinks of alcohol     Types: 2 Cans of beer per week    Drug use: Never    Sexual activity: Not Currently     Partners: Female, Male     Birth control/protection: Vaginal contraceptive ring       Tobacco History:   Social History     Tobacco Use   Smoking Status Never   Smokeless Tobacco Never       Medications:     Current Outpatient Medications:     atomoxetine (STRATTERA) 40 MG capsule, Take 1 capsule by mouth Daily., Disp: , Rfl:     etonogestrel-ethinyl estradiol (NuvaRing) 0.12-0.015 MG/24HR vaginal ring, Insert  "vaginally and leave in place for 3 consecutive weeks, then remove for 1 week., Disp: 9 each, Rfl: 3    Allergies:   Allergies   Allergen Reactions    Cefdinir Other (See Comments)       Immunizations:  Td/Tdap(Booster Q 10 yrs):  ***  Flu (Yearly):  ***  Pneumovax (1 yr after Prevnar):  ***  Fmaxcut02 (1 yr after Pneumo): ***  Colorectal Screening:     Last Completed Colonoscopy    This patient has no relevant Health Maintenance data.      ***  CT for Smoker (Age 55-75, 30pk yr):  ***  Bone Density/DEXA: ***  Hep C ( 6820-9967):  ***  PSA(Over age 50):  ***  US Aorta (For male smokers, age 65):  ***    Diet/Physical activity:***    Sexual health: ***    Depression: PHQ-2 Depression Screening  Little interest or pleasure in doing things?     Feeling down, depressed, or hopeless?     PHQ-2 Total Score        Intimate partner violence: (Screen on initial visit, older adult with injury or evidence of neglect):  Violence can be a problem in many people's lives, so I now ask every patient about trauma or abuse they may have experienced in a relationship.  Stress/Safety - Do you feel safe in your relationship?  Afraid/Abused - Have you ever been in a relationship where you were threatened, hurt, or afraid?  Friend/Family - Are your friends aware you have been hurt?  Emergency Plan - Do you have a safe place to go and the resources you need in an emergency?    Osteoporosis:   Men: history of low trauma fracture, androgen deprivation therapy for prostate cancer, hypogonadism, primary hyperparathyroidism, intestinal disorders.     Objective     Physical Exam:  Vital Signs:   Vitals:    25 1421   BP: 130/89   Pulse: 96   SpO2: 99%   Weight: 60.4 kg (133 lb 3.2 oz)   Height: 172.7 cm (67.99\")     Body mass index is 20.26 kg/m².     Physical Exam    Assessment / Plan      Assessment/Plan:   Problems Addressed This Visit  No diagnosis found.  Screenings due include:***.  Immunizations recommended are:***.  Routine labs " recommended are: ***.    Plan of care reviewed with patient at the conclusion of today's visit. Education was provided regarding diagnosis and management.  Patient verbalizes understanding of and agreement with management plan.    There are no Patient Instructions on file for this visit.    Follow Up:   No follow-ups on file.    {Time Spent (Optional):88460}    LUKE Isaac  [facility] [address] [facility]    Please note that portions of this note were completed with a voice recognition program.

## 2025-07-22 NOTE — PROGRESS NOTES
Female Physical Note      Patient Name: Cookie Magana  : 2000   MRN: [id number]     Chief Complaint:    Chief Complaint   Patient presents with    Annual Exam     yearly       History of Present Illness: Cookie Magana is a 24 y.o. female with pertinent medical history significant for anxiety and recent diagnosis of ADHD.  She presents today for annual wellness exam.  Reports she is continuing to have weekly therapy sessions for management of her anxiety and feels that she is stable.  With recent diagnosis of ADHD, she has initiated medication therapy of Strattera per her mental health provider.  Feels that her symptoms are much controlled and denies any major side effects of this medication.  Does note that she has reduced appetite but being mindful to get 3 balanced meals a day.    Previously with elevated cholesterol readings.  She is eager to see what readings are this year as she has had some dietary changes with more meals being cooked at home, less dining out and fast food.  She stays active and reports she is moving to [place] soon for her job.    Up-to-date on OB/GYN visits as had recent visit.  All normal Pap screening.  She does regular self breast exams, no issues at this time.    She is UTD for vision and dental visits.      Subjective      Review of Systems:   Review of Systems   Constitutional:  Positive for appetite change. Negative for fatigue.   Respiratory:  Negative for cough and shortness of breath.    Cardiovascular:  Negative for chest pain, palpitations and leg swelling.   Gastrointestinal:  Negative for abdominal pain, constipation, diarrhea and GERD.   Psychiatric/Behavioral:  Negative for sleep disturbance. The patient is nervous/anxious.        Past Medical History, Social History, Family History and Care Team were all reviewed with patient and updated as appropriate.     Medications:     Current Outpatient Medications:     atomoxetine (STRATTERA) 40 MG capsule, Take 1  capsule by mouth Daily., Disp: , Rfl:     etonogestrel-ethinyl estradiol (NuvaRing) 0.12-0.015 MG/24HR vaginal ring, Insert vaginally and leave in place for 3 consecutive weeks, then remove for 1 week., Disp: 9 each, Rfl: 3    Allergies:   Allergies   Allergen Reactions    Cefdinir Other (See Comments)       Immunizations:  Td/Tdap(Booster Q 10 yrs): UTD  Flu (Yearly): UTD    Colorectal Screening:     Last Completed Colonoscopy    This patient has no relevant Health Maintenance data.     Not applicable  Pap:    Last Completed Pap Smear            Upcoming       PAP SMEAR (Every 3 Years) Next due on 2024  LIQUID-BASED PAP SMEAR WITH HPV GENOTYPING REGARDLESS OF INTERPRETATION ([facility])    2022  LIQUID-BASED PAP SMEAR, [facility] ([facility])    10/16/2020  SCANNED - PAP SMEAR    2020  Pap IG, Ct-Ng TV Rfx HPV ASCU                           Mammogram:    Last Completed Mammogram    This patient has no relevant Health Maintenance data.        Not applicable    Hep C ( [id number]): Ordered today    Diet/Physical activity: Generally active, more meals cooked at home than fast food        Depression: PHQ-2 Depression Screening  Little interest or pleasure in doing things?  Not at all   Feeling down, depressed, or hopeless? Not at all   PHQ-2 Total Score  0     Intimate partner violence: (Screen on initial visit, pregnant women, women with injuries, older adult with injury or evidence of neglect):  Violence can be a problem in many people's lives, so I now ask every patient about trauma or abuse they may have experienced in a relationship.  Stress/Safety - Do you feel safe in your relationship?  Afraid/Abused - Have you ever been in a relationship where you were threatened, hurt, or afraid?  Friend/Family - Are your friends aware you have been hurt?  Emergency Plan - Do you have a safe place to go and the resources you need in an emergency?    Osteoporosis:   Ost menopausal women < 65  "with RF (advancing age, previous fracture, glucocorticoid therapy, parental hip fracture, low body weight, current cigarette smoking, excessive alcohol consumption, rheumatoid arthritis, secondary osteoporosis [hypogonadism/premature menopause, malabsorption, chronic liver disease, IBD]).  All women 65 or older    Objective     Physical Exam:  Vital Signs:   Vitals:    07/22/25 1421   BP: 130/89   Pulse: 96   SpO2: 99%   Weight: 60.4 kg (133 lb 3.2 oz)   Height: 172.7 cm (67.99\")     Body mass index is 20.26 kg/m².     Physical Exam  Vitals and nursing note reviewed.   Constitutional:       General: She is not in acute distress.  HENT:      Head: Normocephalic and atraumatic.      Right Ear: Tympanic membrane, ear canal and external ear normal.      Left Ear: Tympanic membrane, ear canal and external ear normal.      Mouth/Throat:      Mouth: Mucous membranes are moist.      Pharynx: Oropharynx is clear. No oropharyngeal exudate or posterior oropharyngeal erythema.   Eyes:      General: No scleral icterus.     Conjunctiva/sclera: Conjunctivae normal.   Cardiovascular:      Rate and Rhythm: Normal rate and regular rhythm.      Pulses: Normal pulses.      Heart sounds: No murmur heard.  Pulmonary:      Effort: Pulmonary effort is normal. No respiratory distress.      Breath sounds: No wheezing.   Abdominal:      General: Bowel sounds are normal. There is no distension.      Tenderness: There is no abdominal tenderness.   Musculoskeletal:         General: Normal range of motion.      Cervical back: Normal range of motion and neck supple. No tenderness.      Right lower leg: No edema.      Left lower leg: No edema.   Lymphadenopathy:      Cervical: No cervical adenopathy.   Skin:     General: Skin is warm and dry.   Neurological:      Mental Status: She is alert and oriented to person, place, and time. Mental status is at baseline.      Gait: Gait normal.   Psychiatric:         Mood and Affect: Mood normal.         " Thought Content: Thought content normal.             Assessment / Plan      Assessment/Plan:   Diagnoses and all orders for this visit:    1. Encounter for annual wellness visit (Primary)  -     CBC & Differential; Future  -     Comprehensive Metabolic Panel; Future    2. Thyroid disorder screening  -     TSH Rfx On Abnormal To Free T4; Future    3. Need for hepatitis C screening test  -     Hepatitis C Antibody; Future    4. Screening cholesterol level  -     Lipid Panel; Future    5. Anxiety  -     Vitamin D,25-Hydroxy; Future       Patient Wellness Counseling:   Plan of care reviewed with patient at the conclusion of today's visit. Education was provided in regards to diagnosis, diet and exercise, cervical cancer screening, self breast exams, breast cancer screening.  Nutrition, family planning/contraception, physical activity, healthy weight,ways to reduce stress, adequate sleep, injury prevention, STD's, dental health, mental health, and immunizations.        Plan of care reviewed with patient at the conclusion of today's visit. Education was provided regarding diagnosis, management, and any prescribed or recommended OTC medications.Patient verbalizes understanding of and agreement with management plan.         Follow Up:   Return in about 1 year (around 7/22/2026).    Healthcare Maintenance:     Cookie HOLBROOK [place] voices understanding and acceptance of this advice and will call back with any further questions or concerns. AVS with preventive healthcare tips printed for patient.     LUKE Isaac RD  [facility]  [address]  [facility] [address]  Fax [phone]  Phone [phone]

## 2025-07-22 NOTE — PROGRESS NOTES
"  Follow Up Office Visit      Patient Name: Cookie Magana  : 2000   MRN: [id number]   Care Team: Patient Care Team:  Mitchell Quinn PA-C as PCP - General (Physician Assistant)    Chief Complaint:    Chief Complaint   Patient presents with    Annual Exam     yearly       History of Present Illness: Cookie Magana is a 24 y.o. female who is here today to follow up with ***.     Seeing mental health prvodier and recently started medication therapy for ADHD diagnosis.  Feeling better on this and denies any side effects.    Moving next week.      Low appetite with new med medication, so trying to eat 3 balanced each day.  Coffee 1 cup per day    UTD on vision/dental    Sleep is at baseline.          Subjective      Review of Systems:   Review of Systems    I have reviewed and the following portions of the patient's history were updated as appropriate: past family history, past medical history, past social history, past surgical history and problem list.    Medications:     Current Outpatient Medications:     atomoxetine (STRATTERA) 40 MG capsule, Take 1 capsule by mouth Daily., Disp: , Rfl:     etonogestrel-ethinyl estradiol (NuvaRing) 0.12-0.015 MG/24HR vaginal ring, Insert vaginally and leave in place for 3 consecutive weeks, then remove for 1 week., Disp: 9 each, Rfl: 3    Allergies:   Allergies   Allergen Reactions    Cefdinir Other (See Comments)       Objective     Physical Exam:  Vital Signs:   Vitals:    25 1421   BP: 130/89   Pulse: 96   SpO2: 99%   Weight: 60.4 kg (133 lb 3.2 oz)   Height: 172.7 cm (67.99\")     Body mass index is 20.26 kg/m².     Physical Exam    Assessment / Plan      Assessment/Plan:   Problems Addressed This Visit  No diagnosis found.   Screenings due include:***.  Immunizations recommended are:***.  Routine labs recommended are: ***.    Plan of care reviewed with patient at the conclusion of today's visit. Education was provided regarding diagnosis and management.  " Patient verbalizes understanding of and agreement with management plan.        Follow Up:   No follow-ups on file.    {Time Spent (Optional):54979}    LUKE Isaac  [facility] [address]    Please note that portions of this note were completed with a voice recognition program.

## 2025-07-23 ENCOUNTER — LAB (OUTPATIENT)
Dept: LAB | Facility: HOSPITAL | Age: 25
End: 2025-07-23
Payer: COMMERCIAL

## 2025-07-23 DIAGNOSIS — F41.9 ANXIETY: ICD-10-CM

## 2025-07-23 DIAGNOSIS — Z00.00 ENCOUNTER FOR ANNUAL WELLNESS VISIT: ICD-10-CM

## 2025-07-23 DIAGNOSIS — Z13.220 SCREENING CHOLESTEROL LEVEL: ICD-10-CM

## 2025-07-23 DIAGNOSIS — Z13.29 THYROID DISORDER SCREENING: ICD-10-CM

## 2025-07-23 DIAGNOSIS — Z11.59 NEED FOR HEPATITIS C SCREENING TEST: ICD-10-CM

## 2025-07-23 LAB
25(OH)D3 SERPL-MCNC: 30.2 NG/ML (ref 30–100)
ALBUMIN SERPL-MCNC: 4.4 G/DL (ref 3.5–5.2)
ALBUMIN/GLOB SERPL: 1.4 G/DL
ALP SERPL-CCNC: 64 U/L (ref 39–117)
ALT SERPL W P-5'-P-CCNC: 16 U/L (ref 1–33)
ANION GAP SERPL CALCULATED.3IONS-SCNC: 11.2 MMOL/L (ref 5–15)
AST SERPL-CCNC: 21 U/L (ref 1–32)
BASOPHILS # BLD AUTO: 0.03 10*3/MM3 (ref 0–0.2)
BASOPHILS NFR BLD AUTO: 0.5 % (ref 0–1.5)
BILIRUB SERPL-MCNC: 0.2 MG/DL (ref 0–1.2)
BUN SERPL-MCNC: 6 MG/DL (ref 6–20)
BUN/CREAT SERPL: 7.3 (ref 7–25)
CALCIUM SPEC-SCNC: 9.8 MG/DL (ref 8.6–10.5)
CHLORIDE SERPL-SCNC: 106 MMOL/L (ref 98–107)
CHOLEST SERPL-MCNC: 161 MG/DL (ref 0–200)
CO2 SERPL-SCNC: 22.8 MMOL/L (ref 22–29)
CREAT SERPL-MCNC: 0.82 MG/DL (ref 0.57–1)
DEPRECATED RDW RBC AUTO: 39.2 FL (ref 37–54)
EGFRCR SERPLBLD CKD-EPI 2021: 101.3 ML/MIN/1.73
EOSINOPHIL # BLD AUTO: 0.05 10*3/MM3 (ref 0–0.4)
EOSINOPHIL NFR BLD AUTO: 0.8 % (ref 0.3–6.2)
ERYTHROCYTE [DISTWIDTH] IN BLOOD BY AUTOMATED COUNT: 12.1 % (ref 12.3–15.4)
GLOBULIN UR ELPH-MCNC: 3.1 GM/DL
GLUCOSE SERPL-MCNC: 90 MG/DL (ref 65–99)
HCT VFR BLD AUTO: 39.3 % (ref 34–46.6)
HCV AB SER QL: NORMAL
HDLC SERPL-MCNC: 43 MG/DL (ref 40–60)
HGB BLD-MCNC: 13.2 G/DL (ref 12–15.9)
IMM GRANULOCYTES # BLD AUTO: 0.01 10*3/MM3 (ref 0–0.05)
IMM GRANULOCYTES NFR BLD AUTO: 0.2 % (ref 0–0.5)
LDLC SERPL CALC-MCNC: 101 MG/DL (ref 0–100)
LDLC/HDLC SERPL: 2.33 {RATIO}
LYMPHOCYTES # BLD AUTO: 2.72 10*3/MM3 (ref 0.7–3.1)
LYMPHOCYTES NFR BLD AUTO: 45.5 % (ref 19.6–45.3)
MCH RBC QN AUTO: 30.3 PG (ref 26.6–33)
MCHC RBC AUTO-ENTMCNC: 33.6 G/DL (ref 31.5–35.7)
MCV RBC AUTO: 90.3 FL (ref 79–97)
MONOCYTES # BLD AUTO: 0.44 10*3/MM3 (ref 0.1–0.9)
MONOCYTES NFR BLD AUTO: 7.4 % (ref 5–12)
NEUTROPHILS NFR BLD AUTO: 2.73 10*3/MM3 (ref 1.7–7)
NEUTROPHILS NFR BLD AUTO: 45.6 % (ref 42.7–76)
NRBC BLD AUTO-RTO: 0 /100 WBC (ref 0–0.2)
PLATELET # BLD AUTO: 278 10*3/MM3 (ref 140–450)
PMV BLD AUTO: 10 FL (ref 6–12)
POTASSIUM SERPL-SCNC: 3.9 MMOL/L (ref 3.5–5.2)
PROT SERPL-MCNC: 7.5 G/DL (ref 6–8.5)
RBC # BLD AUTO: 4.35 10*6/MM3 (ref 3.77–5.28)
SODIUM SERPL-SCNC: 140 MMOL/L (ref 136–145)
T4 FREE SERPL-MCNC: 1.32 NG/DL (ref 0.92–1.68)
TRIGL SERPL-MCNC: 89 MG/DL (ref 0–150)
TSH SERPL DL<=0.05 MIU/L-ACNC: 5 UIU/ML (ref 0.27–4.2)
VLDLC SERPL-MCNC: 17 MG/DL (ref 5–40)
WBC NRBC COR # BLD AUTO: 5.98 10*3/MM3 (ref 3.4–10.8)

## 2025-07-23 PROCEDURE — 86803 HEPATITIS C AB TEST: CPT

## 2025-07-23 PROCEDURE — 80050 GENERAL HEALTH PANEL: CPT

## 2025-07-23 PROCEDURE — 82306 VITAMIN D 25 HYDROXY: CPT

## 2025-07-23 PROCEDURE — 80061 LIPID PANEL: CPT

## 2025-07-23 PROCEDURE — 84439 ASSAY OF FREE THYROXINE: CPT
